# Patient Record
Sex: FEMALE | Race: WHITE | NOT HISPANIC OR LATINO
[De-identification: names, ages, dates, MRNs, and addresses within clinical notes are randomized per-mention and may not be internally consistent; named-entity substitution may affect disease eponyms.]

---

## 2020-01-29 ENCOUNTER — TRANSCRIPTION ENCOUNTER (OUTPATIENT)
Age: 50
End: 2020-01-29

## 2020-04-26 ENCOUNTER — MESSAGE (OUTPATIENT)
Age: 50
End: 2020-04-26

## 2020-05-13 LAB
SARS-COV-2 IGG SERPL IA-ACNC: <0.1 INDEX
SARS-COV-2 IGG SERPL QL IA: NEGATIVE

## 2020-08-03 ENCOUNTER — INPATIENT (INPATIENT)
Facility: HOSPITAL | Age: 50
LOS: 0 days | Discharge: HOME | End: 2020-08-04
Attending: HOSPITALIST | Admitting: HOSPITALIST
Payer: COMMERCIAL

## 2020-08-03 VITALS
RESPIRATION RATE: 18 BRPM | HEART RATE: 91 BPM | DIASTOLIC BLOOD PRESSURE: 70 MMHG | OXYGEN SATURATION: 95 % | SYSTOLIC BLOOD PRESSURE: 128 MMHG

## 2020-08-03 LAB
ALBUMIN SERPL ELPH-MCNC: 4.7 G/DL — SIGNIFICANT CHANGE UP (ref 3.5–5.2)
ALP SERPL-CCNC: 47 U/L — SIGNIFICANT CHANGE UP (ref 30–115)
ALT FLD-CCNC: 50 U/L — HIGH (ref 0–41)
ANION GAP SERPL CALC-SCNC: 13 MMOL/L — SIGNIFICANT CHANGE UP (ref 7–14)
AST SERPL-CCNC: 42 U/L — HIGH (ref 0–41)
BASOPHILS # BLD AUTO: 0.03 K/UL — SIGNIFICANT CHANGE UP (ref 0–0.2)
BASOPHILS NFR BLD AUTO: 0.6 % — SIGNIFICANT CHANGE UP (ref 0–1)
BILIRUB SERPL-MCNC: 0.8 MG/DL — SIGNIFICANT CHANGE UP (ref 0.2–1.2)
BUN SERPL-MCNC: 11 MG/DL — SIGNIFICANT CHANGE UP (ref 10–20)
CALCIUM SERPL-MCNC: 9 MG/DL — SIGNIFICANT CHANGE UP (ref 8.5–10.1)
CHLORIDE SERPL-SCNC: 98 MMOL/L — SIGNIFICANT CHANGE UP (ref 98–110)
CO2 SERPL-SCNC: 27 MMOL/L — SIGNIFICANT CHANGE UP (ref 17–32)
CREAT SERPL-MCNC: 1 MG/DL — SIGNIFICANT CHANGE UP (ref 0.7–1.5)
EOSINOPHIL # BLD AUTO: 0.02 K/UL — SIGNIFICANT CHANGE UP (ref 0–0.7)
EOSINOPHIL NFR BLD AUTO: 0.4 % — SIGNIFICANT CHANGE UP (ref 0–8)
GLUCOSE SERPL-MCNC: 108 MG/DL — HIGH (ref 70–99)
HCG SERPL QL: NEGATIVE — SIGNIFICANT CHANGE UP
HCT VFR BLD CALC: 44.1 % — SIGNIFICANT CHANGE UP (ref 37–47)
HGB BLD-MCNC: 14.9 G/DL — SIGNIFICANT CHANGE UP (ref 12–16)
IMM GRANULOCYTES NFR BLD AUTO: 0.4 % — HIGH (ref 0.1–0.3)
LYMPHOCYTES # BLD AUTO: 1.64 K/UL — SIGNIFICANT CHANGE UP (ref 1.2–3.4)
LYMPHOCYTES # BLD AUTO: 33.8 % — SIGNIFICANT CHANGE UP (ref 20.5–51.1)
MCHC RBC-ENTMCNC: 29 PG — SIGNIFICANT CHANGE UP (ref 27–31)
MCHC RBC-ENTMCNC: 33.8 G/DL — SIGNIFICANT CHANGE UP (ref 32–37)
MCV RBC AUTO: 85.8 FL — SIGNIFICANT CHANGE UP (ref 81–99)
MONOCYTES # BLD AUTO: 0.85 K/UL — HIGH (ref 0.1–0.6)
MONOCYTES NFR BLD AUTO: 17.5 % — HIGH (ref 1.7–9.3)
NEUTROPHILS # BLD AUTO: 2.29 K/UL — SIGNIFICANT CHANGE UP (ref 1.4–6.5)
NEUTROPHILS NFR BLD AUTO: 47.3 % — SIGNIFICANT CHANGE UP (ref 42.2–75.2)
NRBC # BLD: 0 /100 WBCS — SIGNIFICANT CHANGE UP (ref 0–0)
PLATELET # BLD AUTO: 159 K/UL — SIGNIFICANT CHANGE UP (ref 130–400)
POTASSIUM SERPL-MCNC: 4.1 MMOL/L — SIGNIFICANT CHANGE UP (ref 3.5–5)
POTASSIUM SERPL-SCNC: 4.1 MMOL/L — SIGNIFICANT CHANGE UP (ref 3.5–5)
PROT SERPL-MCNC: 7.4 G/DL — SIGNIFICANT CHANGE UP (ref 6–8)
RBC # BLD: 5.14 M/UL — SIGNIFICANT CHANGE UP (ref 4.2–5.4)
RBC # FLD: 13.2 % — SIGNIFICANT CHANGE UP (ref 11.5–14.5)
SARS-COV-2 RNA SPEC QL NAA+PROBE: DETECTED
SODIUM SERPL-SCNC: 138 MMOL/L — SIGNIFICANT CHANGE UP (ref 135–146)
TROPONIN T SERPL-MCNC: <0.01 NG/ML — SIGNIFICANT CHANGE UP
WBC # BLD: 4.85 K/UL — SIGNIFICANT CHANGE UP (ref 4.8–10.8)
WBC # FLD AUTO: 4.85 K/UL — SIGNIFICANT CHANGE UP (ref 4.8–10.8)

## 2020-08-03 PROCEDURE — 93010 ELECTROCARDIOGRAM REPORT: CPT | Mod: 77

## 2020-08-03 PROCEDURE — 71045 X-RAY EXAM CHEST 1 VIEW: CPT | Mod: 26

## 2020-08-03 PROCEDURE — 99285 EMERGENCY DEPT VISIT HI MDM: CPT

## 2020-08-03 PROCEDURE — 93010 ELECTROCARDIOGRAM REPORT: CPT

## 2020-08-03 RX ORDER — IBUPROFEN 200 MG
400 TABLET ORAL ONCE
Refills: 0 | Status: COMPLETED | OUTPATIENT
Start: 2020-08-03 | End: 2020-08-03

## 2020-08-03 RX ORDER — ENOXAPARIN SODIUM 100 MG/ML
40 INJECTION SUBCUTANEOUS DAILY
Refills: 0 | Status: DISCONTINUED | OUTPATIENT
Start: 2020-08-03 | End: 2020-08-04

## 2020-08-03 RX ORDER — SODIUM CHLORIDE 9 MG/ML
1000 INJECTION, SOLUTION INTRAVENOUS ONCE
Refills: 0 | Status: COMPLETED | OUTPATIENT
Start: 2020-08-03 | End: 2020-08-03

## 2020-08-03 RX ADMIN — SODIUM CHLORIDE 1000 MILLILITER(S): 9 INJECTION, SOLUTION INTRAVENOUS at 09:52

## 2020-08-03 RX ADMIN — Medication 400 MILLIGRAM(S): at 11:40

## 2020-08-03 NOTE — ED PROVIDER NOTE - PHYSICAL EXAMINATION
Vital Signs: I have reviewed the initial vital signs.  Constitutional: NAD, well-nourished, appears stated age, no acute distress.  HEENT: Airway patent, moist MM, no erythema/swelling/deformity of oral structures. EOMI, PERRLA.  CV: regular rate, regular rhythm, well-perfused extremities, 2+ b/l DP and radial pulses equal.  Lungs: BCTA, no increased WOB.  ABD: NTND, no guarding or rebound, no pulsatile mass, no hernias.   MSK: Neck supple, nontender, nl ROM, no stepoff. Chest nontender. Back nontender in TLS spine or to b/l bony structures or flanks. Ext nontender, nl rom, no deformity.   INTEG: Skin warm, dry, no rash.  NEURO: A&Ox3, moving all extremities, normal speech. cn 2-12 intact, normal strength, gait, and cerebellar testing  PSYCH: Calm, cooperative, normal affect and interaction.

## 2020-08-03 NOTE — H&P ADULT - NSHPLABSRESULTS_GEN_ALL_CORE
cbc                          14.9   4.85  )-----------( 159      ( 03 Aug 2020 09:15 )             44.1     08-03    138  |  98  |  11  ----------------------------<  108<H>  4.1   |  27  |  1.0    Ca    9.0      03 Aug 2020 09:15    TPro  7.4  /  Alb  4.7  /  TBili  0.8  /  DBili  x   /  AST  42<H>  /  ALT  50<H>  /  AlkPhos  47  08-03    < from: Xray Chest 1 View- PORTABLE-Urgent (08.03.20 @ 10:57) >      Impression:    No radiographic evidence of acute cardiopulmonary disease.      < end of copied text >

## 2020-08-03 NOTE — H&P ADULT - ASSESSMENT
49 y/o female with medical history of HTN, admitted for vasovagal syncope.    # Vasovagal syncope, low suspicion of seizure  - syncopized for 10-15 seconds  - felt warm, nauseous, light headedness, and dizziness prior to blacking out  - Check orthostatics,   - Check echo, to rule out any structural heart disease  - no post-tictal state,  - Admit to Tele  - no need EEG as suspicion for seizure is low    # HTN  - Stable    #) MISC  Activity: IAT  DVT ppx: lovenox   GI ppx: no need  Diet: DASH/TLC  Code: Full  Dispo: anticipate discharge within 24-48 hours  CHG wash

## 2020-08-03 NOTE — H&P ADULT - HISTORY OF PRESENT ILLNESS
49 y/o female with medical history of HTN, presented to ED after an episode of black out. Patient works at [employer]. Earlier during the day, Patient blacked out. Prior to passing out patient suddenly felt warm, light headed, and was very nauseous. Patient passed out for 10-15 seconds, witnessed by co-worker   sustained syncopal episode today at work. Was standing when she developed light-headedness, dark vision and nausea then passed out. Was caught by a co-worker. Did not hit head. No chest pain, palpitations or SOB before or after event. No H/A, no neck pain. No seizure activity. Currently back to baseline.  O/E: Constitutional: Non-toxic in appearance. Eyes: PERRL. No pallor, no jaundice. Neck: Neck supple, no meningeal signs. Respiratory: Lungs CTA and equal b/l. Cardio: S1 S2 regular, no murmur. Equal pulses in all extremities.  ABD: ABD soft, no tenderness. Extremities: No pedal edema, no calf tenderness. Skin: No skin rash. Neuro: A&O x 3, CN II-XII intact, strength 5/5 b/l, sensation intact and equal, finger-to-nose intact. 51 y/o female with medical history of HTN, presented to ED after an episode of black out. Patient works at [employer]. Earlier during the day, Patient blacked out. Prior to passing out patient suddenly felt warm, light headed, and was very nauseous, no palpitation, no perceptual changes. Patient passed out for 10-15 seconds, witnessed by co-worker, did not hit her head. No confusion after she woke up. She was fully coherent. Patient has no cardiac history, no seizure history. This is the first time it has happened. Per patient she did not have any thing for breakfast. Patient denies, headaches, SOB, fever, chills, palpitation, abdominal pain, change in bowel habits, or urinary habit changes.  Patient is admitted to medicine team for vasovagal syncope    ED vit: 128/70 HR: 91

## 2020-08-03 NOTE — ED PROVIDER NOTE - OBJECTIVE STATEMENT
50yF presents s/p syncopal episode at work. pt is [employer] employee who was standing doing nonexertional work when she felt a wave of nausea, became lightheaded, and slumped over. did not fall to the ground or hit head. brief loc, without residual symptoms. no cp sob palpitations ha fnd. asymptomatic at present. only pmh is htn.

## 2020-08-03 NOTE — H&P ADULT - ATTENDING COMMENTS
HPI as above.  Interval history: Pt seen and examined at bedside. No cp or sob.  No further syncopal events.   Vital Signs (24 Hrs):  T(C): 35.2 (08-04-20 @ 05:00), Max: 37.1 (08-03-20 @ 15:47)  HR: 67 (08-04-20 @ 07:30) (67 - 81)  BP: 143/89 (08-04-20 @ 05:00) (123/72 - 170/85)  RR: 18 (08-04-20 @ 07:30) (18 - 20)  SpO2: 99% (08-04-20 @ 07:30) (98% - 99%)  Wt(kg): --  Daily     Daily     I&O's Summary    PHYSICAL EXAM:  GENERAL: NAD, well-developed  HEAD:  Atraumatic, Normocephalic  EYES: EOMI, PERRLA, conjunctiva and sclera clear  NECK: Supple, No JVD  CHEST/LUNG: Clear to auscultation bilaterally; No wheeze  HEART: Regular rate and rhythm; No murmurs, rubs, or gallops  ABDOMEN: Soft, Nontender, Nondistended; Bowel sounds present  EXTREMITIES:  2+ Peripheral Pulses, No clubbing, cyanosis, or edema  PSYCH: AAOx3  NEUROLOGY: non-focal  SKIN: No rashes or lesions    Labs reviewed  Imaging reviewed: < from: Xray Chest 1 View- PORTABLE-Urgent (08.03.20 @ 10:57) >    Impression:    No radiographic evidence of acute cardiopulmonary disease.    < end of copied text >    CTH pending.       EKG reviewed: < from: 12 Lead ECG (08.03.20 @ 12:07) >    Diagnosis Line Normal sinus rhythm  Nonspecific ST abnormality  Abnormal ECG    < end of copied text >    Plan  #syncope- likely 2/2 vasovagal  pt states that she passed out 10-15 seconds, she was t work and was standing when it happened  pt found to be covid 19 positive  orthostats neg  no events on cardiac telemonitoring overnight  pt states that she was not drinking enough water and is also on HCTZ  likely DC HCTZ on DC  CTH ordered r/o structual, unlikely CTA  Unlikely SZR  Limited echo ordered, if unable to do inpt possible outpt echo given her covid status   will check d-dimer given covid status     #COVID 19- pt asymptomatic, no infiltrates on CXR and pt on RA. no antibiotics. Counselled on quarantining     #HTN- can continue valsartan outpt but hold hCTZ     #Progress Note Handoff  Pending (specify):  if above tests are neg can be dced and follow up outpt  Family discussion: linda pt at bedside and agreed to plan   Disposition: Home__x_/SNF___/Other________/Unknown at this time________ HPI as above.  Interval history: Pt seen and examined at bedside. No cp or sob.  No further syncopal events.   Vital Signs (24 Hrs):  T(C): 35.2 (08-04-20 @ 05:00), Max: 37.1 (08-03-20 @ 15:47)  HR: 67 (08-04-20 @ 07:30) (67 - 81)  BP: 143/89 (08-04-20 @ 05:00) (123/72 - 170/85)  RR: 18 (08-04-20 @ 07:30) (18 - 20)  SpO2: 99% (08-04-20 @ 07:30) (98% - 99%)  Wt(kg): --  Daily     Daily     I&O's Summary    PHYSICAL EXAM:  GENERAL: NAD, well-developed  HEAD:  Atraumatic, Normocephalic  EYES: EOMI, PERRLA, conjunctiva and sclera clear  NECK: Supple, No JVD  CHEST/LUNG: Clear to auscultation bilaterally; No wheeze  HEART: Regular rate and rhythm; No murmurs, rubs, or gallops  ABDOMEN: Soft, Nontender, Nondistended; Bowel sounds present  EXTREMITIES:  2+ Peripheral Pulses, No clubbing, cyanosis, or edema  PSYCH: AAOx3  NEUROLOGY: non-focal  SKIN: No rashes or lesions    Labs reviewed  Imaging reviewed: < from: Xray Chest 1 View- PORTABLE-Urgent (08.03.20 @ 10:57) >    Impression:    No radiographic evidence of acute cardiopulmonary disease.    < end of copied text >    CTH pending.       EKG reviewed: < from: 12 Lead ECG (08.03.20 @ 12:07) >    Diagnosis Line Normal sinus rhythm  Nonspecific ST abnormality  Abnormal ECG    < end of copied text >    Plan  #syncope- likely 2/2 vasovagal  pt states that she passed out 10-15 seconds, she was t work and was standing when it happened  pt found to be covid 19 positive  orthostats neg  no events on cardiac telemonitoring overnight  pt states that she was not drinking enough water and is also on HCTZ  likely DC HCTZ on DC  CTH ordered r/o structual, unlikely CTA  Unlikely SZR  Limited echo ordered, if unable to do inpt possible outpt echo given her covid status   will check d-dimer given covid status   trop neg x1 will trend    #COVID 19- pt asymptomatic, no infiltrates on CXR and pt on RA. no antibiotics. Counselled on quarantining     #HTN- can continue valsartan outpt but hold hCTZ     #Progress Note Handoff  Pending (specify):  if above tests are neg can be dced and follow up outpt  Family discussion: linda pt at bedside and agreed to plan   Disposition: Home__x_/SNF___/Other________/Unknown at this time________

## 2020-08-03 NOTE — ED ADULT NURSE NOTE - NS PRO PASSIVE SMOKE EXP
What Type Of Note Output Would You Prefer (Optional)?: Standard Output How Severe Is Your Skin Lesion?: mild Has Your Skin Lesion Been Treated?: been treated Is This A New Presentation, Or A Follow-Up?: Skin Lesion Unknown

## 2020-08-03 NOTE — H&P ADULT - NSHPPHYSICALEXAM_GEN_ALL_CORE
GENERAL: NAD, well-developed, AAOx3  HEENT:  Atraumatic, Normocephalic. EOMI, PERRLA, conjunctiva and sclera clear, No JVD  PULMONARY: Clear to auscultation bilaterally; No wheeze  CARDIOVASCULAR: Regular rate and rhythm; No murmurs, rubs, or gallops  GASTROINTESTINAL: Soft, Nontender, Nondistended; Bowel sounds present  MUSCULOSKELETAL:  2+ Peripheral Pulses, No clubbing, cyanosis, or edema  NEUROLOGY: non-focal  SKIN: No rashes or lesions

## 2020-08-03 NOTE — ED PROVIDER NOTE - NS ED ROS FT
Eyes:  No visual changes, eye pain or discharge.  ENMT:  No hearing changes, pain, no sore throat or runny nose, no difficulty swallowing  Cardiac:  No chest pain, SOB or edema. No chest pain with exertion.  Respiratory:  No cough or respiratory distress.    GI:  +nausea  :  No dysuria, frequency or burning.  MS:  +weakness  Neuro:  +LOC  Skin:  No skin rash.   Endocrine: No history of thyroid disease or diabetes.

## 2020-08-03 NOTE — ED PROVIDER NOTE - PROGRESS NOTE DETAILS
ATTENDING NOTE: 51 y/o female with hx of HTN, sustained syncopal episode today at work. Was standing when she developed light-headedness, sweating and nausea then passed out. Was caught by a co-worker. Did not hit head. No chest pain, palpitations or SOB before or after event. No H/A, no neck pain. No seizure activity. Currently back to baseline.  O/E: Constitutional: Non-toxic in appearance. Eyes: PERRL. No pallor, no jaundice. Neck: Neck supple, no meningeal signs. Respiratory: Lungs CTA and equal b/l. Cardio: S1 S2 regular, no murmur. Equal pulses in all extremities.  ABD: ABD soft, no tenderness. Extremities: No pedal edema, no calf tenderness. Skin: No skin rash. Neuro: No neurologic deficits.   EKG: sinus @ 80, 1mm ST depressions V4-V6, II, III, aVF. No WPW, no Brugada, QTc 457.  Imp: Syncope. History suggest vasovagal however with EKG changes. No previous EKG available.   A/P: Cardiac monitor, will check labs, electrolytes, cardiac enzymes. Will re-assess. ATTENDING NOTE: 49 y/o female with hx of HTN, sustained syncopal episode today at work. Was standing when she developed light-headedness, dark vision and nausea then passed out. Was caught by a co-worker. Did not hit head. No chest pain, palpitations or SOB before or after event. No H/A, no neck pain. No seizure activity. Currently back to baseline.  O/E: Constitutional: Non-toxic in appearance. Eyes: PERRL. No pallor, no jaundice. Neck: Neck supple, no meningeal signs. Respiratory: Lungs CTA and equal b/l. Cardio: S1 S2 regular, no murmur. Equal pulses in all extremities.  ABD: ABD soft, no tenderness. Extremities: No pedal edema, no calf tenderness. Skin: No skin rash. Neuro: A&O x 3, CN II-XII intact, strength 5/5 b/l, sensation intact and equal, finger-to-nose intact.  EKG: sinus @ 80, 1mm ST depressions V4-V6, II, III, aVF. No WPW, no Brugada, QTc 457.  Imp: Syncope. History suggest vasovagal however with EKG changes. No previous EKG available.   A/P: Cardiac monitor, will check labs, electrolytes, cardiac enzymes. Will re-assess.

## 2020-08-03 NOTE — ED PROVIDER NOTE - CLINICAL SUMMARY MEDICAL DECISION MAKING FREE TEXT BOX
Patient hd second episode of near syncope while sitting in the stretcher in te ED. Heart rate dropped to 40. Quickly returned ot baseline. Tn negative. Given recurrent syncope and ST depressions, will admit to monitored setting.

## 2020-08-04 ENCOUNTER — TRANSCRIPTION ENCOUNTER (OUTPATIENT)
Age: 50
End: 2020-08-04

## 2020-08-04 VITALS
RESPIRATION RATE: 18 BRPM | TEMPERATURE: 96 F | DIASTOLIC BLOOD PRESSURE: 76 MMHG | SYSTOLIC BLOOD PRESSURE: 136 MMHG | HEART RATE: 71 BPM

## 2020-08-04 PROBLEM — Z00.00 ENCOUNTER FOR PREVENTIVE HEALTH EXAMINATION: Status: ACTIVE | Noted: 2020-08-04

## 2020-08-04 LAB
A1C WITH ESTIMATED AVERAGE GLUCOSE RESULT: 5.8 % — HIGH (ref 4–5.6)
ALBUMIN SERPL ELPH-MCNC: 4.5 G/DL — SIGNIFICANT CHANGE UP (ref 3.5–5.2)
ALP SERPL-CCNC: 42 U/L — SIGNIFICANT CHANGE UP (ref 30–115)
ALT FLD-CCNC: 46 U/L — HIGH (ref 0–41)
ANION GAP SERPL CALC-SCNC: 12 MMOL/L — SIGNIFICANT CHANGE UP (ref 7–14)
AST SERPL-CCNC: 35 U/L — SIGNIFICANT CHANGE UP (ref 0–41)
BASOPHILS # BLD AUTO: 0.02 K/UL — SIGNIFICANT CHANGE UP (ref 0–0.2)
BASOPHILS NFR BLD AUTO: 0.4 % — SIGNIFICANT CHANGE UP (ref 0–1)
BILIRUB SERPL-MCNC: 0.7 MG/DL — SIGNIFICANT CHANGE UP (ref 0.2–1.2)
BUN SERPL-MCNC: 11 MG/DL — SIGNIFICANT CHANGE UP (ref 10–20)
CALCIUM SERPL-MCNC: 9.3 MG/DL — SIGNIFICANT CHANGE UP (ref 8.5–10.1)
CHLORIDE SERPL-SCNC: 98 MMOL/L — SIGNIFICANT CHANGE UP (ref 98–110)
CHOLEST SERPL-MCNC: 183 MG/DL — SIGNIFICANT CHANGE UP (ref 100–200)
CO2 SERPL-SCNC: 29 MMOL/L — SIGNIFICANT CHANGE UP (ref 17–32)
CREAT SERPL-MCNC: 0.8 MG/DL — SIGNIFICANT CHANGE UP (ref 0.7–1.5)
D DIMER BLD IA.RAPID-MCNC: 76 NG/ML DDU — SIGNIFICANT CHANGE UP (ref 0–230)
EOSINOPHIL # BLD AUTO: 0.03 K/UL — SIGNIFICANT CHANGE UP (ref 0–0.7)
EOSINOPHIL NFR BLD AUTO: 0.6 % — SIGNIFICANT CHANGE UP (ref 0–8)
ESTIMATED AVERAGE GLUCOSE: 120 MG/DL — HIGH (ref 68–114)
GLUCOSE SERPL-MCNC: 111 MG/DL — HIGH (ref 70–99)
HCT VFR BLD CALC: 43.6 % — SIGNIFICANT CHANGE UP (ref 37–47)
HDLC SERPL-MCNC: 34 MG/DL — LOW
HGB BLD-MCNC: 14.5 G/DL — SIGNIFICANT CHANGE UP (ref 12–16)
IMM GRANULOCYTES NFR BLD AUTO: 0.2 % — SIGNIFICANT CHANGE UP (ref 0.1–0.3)
LIPID PNL WITH DIRECT LDL SERPL: 116 MG/DL — SIGNIFICANT CHANGE UP (ref 4–129)
LYMPHOCYTES # BLD AUTO: 1.82 K/UL — SIGNIFICANT CHANGE UP (ref 1.2–3.4)
LYMPHOCYTES # BLD AUTO: 37.5 % — SIGNIFICANT CHANGE UP (ref 20.5–51.1)
MAGNESIUM SERPL-MCNC: 2 MG/DL — SIGNIFICANT CHANGE UP (ref 1.8–2.4)
MCHC RBC-ENTMCNC: 28.5 PG — SIGNIFICANT CHANGE UP (ref 27–31)
MCHC RBC-ENTMCNC: 33.3 G/DL — SIGNIFICANT CHANGE UP (ref 32–37)
MCV RBC AUTO: 85.8 FL — SIGNIFICANT CHANGE UP (ref 81–99)
MONOCYTES # BLD AUTO: 0.59 K/UL — SIGNIFICANT CHANGE UP (ref 0.1–0.6)
MONOCYTES NFR BLD AUTO: 12.2 % — HIGH (ref 1.7–9.3)
NEUTROPHILS # BLD AUTO: 2.38 K/UL — SIGNIFICANT CHANGE UP (ref 1.4–6.5)
NEUTROPHILS NFR BLD AUTO: 49.1 % — SIGNIFICANT CHANGE UP (ref 42.2–75.2)
NRBC # BLD: 0 /100 WBCS — SIGNIFICANT CHANGE UP (ref 0–0)
PHOSPHATE SERPL-MCNC: 3 MG/DL — SIGNIFICANT CHANGE UP (ref 2.1–4.9)
PLATELET # BLD AUTO: 152 K/UL — SIGNIFICANT CHANGE UP (ref 130–400)
POTASSIUM SERPL-MCNC: 4 MMOL/L — SIGNIFICANT CHANGE UP (ref 3.5–5)
POTASSIUM SERPL-SCNC: 4 MMOL/L — SIGNIFICANT CHANGE UP (ref 3.5–5)
PROT SERPL-MCNC: 7.1 G/DL — SIGNIFICANT CHANGE UP (ref 6–8)
RBC # BLD: 5.08 M/UL — SIGNIFICANT CHANGE UP (ref 4.2–5.4)
RBC # FLD: 13.1 % — SIGNIFICANT CHANGE UP (ref 11.5–14.5)
SODIUM SERPL-SCNC: 139 MMOL/L — SIGNIFICANT CHANGE UP (ref 135–146)
TOTAL CHOLESTEROL/HDL RATIO MEASUREMENT: 5.4 RATIO — SIGNIFICANT CHANGE UP (ref 4–5.5)
TRIGL SERPL-MCNC: 162 MG/DL — HIGH (ref 10–149)
TROPONIN T SERPL-MCNC: <0.01 NG/ML — SIGNIFICANT CHANGE UP
WBC # BLD: 4.85 K/UL — SIGNIFICANT CHANGE UP (ref 4.8–10.8)
WBC # FLD AUTO: 4.85 K/UL — SIGNIFICANT CHANGE UP (ref 4.8–10.8)

## 2020-08-04 PROCEDURE — 70450 CT HEAD/BRAIN W/O DYE: CPT | Mod: 26

## 2020-08-04 PROCEDURE — 99236 HOSP IP/OBS SAME DATE HI 85: CPT

## 2020-08-04 RX ORDER — IBUPROFEN 200 MG
400 TABLET ORAL EVERY 8 HOURS
Refills: 0 | Status: DISCONTINUED | OUTPATIENT
Start: 2020-08-04 | End: 2020-08-04

## 2020-08-04 RX ADMIN — Medication 400 MILLIGRAM(S): at 12:40

## 2020-08-04 RX ADMIN — Medication 400 MILLIGRAM(S): at 03:12

## 2020-08-04 RX ADMIN — Medication 400 MILLIGRAM(S): at 11:54

## 2020-08-04 RX ADMIN — Medication 400 MILLIGRAM(S): at 04:15

## 2020-08-04 NOTE — DISCHARGE NOTE NURSING/CASE MANAGEMENT/SOCIAL WORK - PATIENT PORTAL LINK FT
You can access the FollowMyHealth Patient Portal offered by [facility] by registering at the following website: [email or web address]. By joining [facility]’s FollowMyHealth portal, you will also be able to view your health information using other applications (apps) compatible with our system.

## 2020-08-04 NOTE — PROGRESS NOTE ADULT - ASSESSMENT
50 year old F with medical history of HTN, admitted due to an episode of syncope.    #Likely Vasovagal syncope  - synopsized for 10-15 seconds. Was preceded by feeling of warmth, nausea, light handedness and dizziness  - Orthostatics negative   - No need for EEG. Seizure highly unlikely   - Echo pending     #Asymptomatic COVID-19 infection   - Pulse Ox fine on room air  - No indication for treatement     #HTN  - Stable    #Misc  - DVT Prophylaxis: Lovenox   - Diet: DASH/TLC  - GI Prophylaxis: Not indicated   - Activity: As tolerated   - Code Status: Full Code     Dispo: Possible discharge today. Pending echo

## 2020-08-04 NOTE — DISCHARGE NOTE PROVIDER - HOSPITAL COURSE
50 year old F with medical history of HTN, admitted due to an episode of syncope.        #Likely Vasovagal syncope    Synopsized for 10-15 seconds. Preceded by feeling of warmth, nausea, light handedness and dizziness. Orthostatics negative. No need for EEG. Seizure highly unlikely. Reports missing her morning meal on the day of presentation          #Asymptomatic COVID-19 infection     Pulse Ox fine on room air. No indication for treatement. 14 day quarantine post-discharge         #HTN    Stable. Continue home meds 50 year old F with medical history of HTN, admitted due to an episode of syncope.        #Likely Vasovagal syncope    Synopsized for 10-15 seconds. Preceded by feeling of warmth, nausea, light handedness and dizziness. Orthostatics negative. No need for EEG. Seizure highly unlikely. Reports missing her morning meal on the day of presentation. CT head negative         #Asymptomatic COVID-19 infection     Pulse Ox fine on room air. No indication for treatement. 14 day quarantine post-discharge         #HTN    Stable. Continue home meds

## 2020-08-04 NOTE — PROGRESS NOTE ADULT - SUBJECTIVE AND OBJECTIVE BOX
EDY BYRNE 50y Female  MRN#: [id number]   Hospital Day: 1d    HPI  49 y/o female with medical history of HTN, presented to ED after an episode of black out. Patient works at [employer]. Earlier during the day, Patient blacked out. Prior to passing out patient suddenly felt warm, light headed, and was very nauseous, no palpitation, no perceptual changes. Patient passed out for 10-15 seconds, witnessed by co-worker, did not hit her head. No confusion after she woke up. She was fully coherent. Patient has no cardiac history, no seizure history. This is the first time it has happened. Per patient she did not have any thing for breakfast. Patient denies, headaches, SOB, fever, chills, palpitation, abdominal pain, change in bowel habits, or urinary habit changes.  Patient is admitted to medicine team for vasovagal syncope    ED vit: 128/70 HR: 91 (03 Aug 2020 14:36)      SUBJECTIVE  Patient is a 50y old Female who presents with a chief complaint of Syncope    INTERVAL HPI AND OVERNIGHT EVENTS:  Patient was examined and seen at bedside. This morning she is resting comfortably in bed and reports no issues or overnight events. She reports no further episodes of syncope      OBJECTIVE  PAST MEDICAL & SURGICAL HISTORY  HTN (hypertension)  No significant past surgical history    ALLERGIES:  No Known Allergies    MEDICATIONS:  STANDING MEDICATIONS  enoxaparin Injectable 40 milliGRAM(s) SubCutaneous daily    PRN MEDICATIONS  ibuprofen  Tablet. 400 milliGRAM(s) Oral every 8 hours PRN      VITAL SIGNS: Last 24 Hours  T(C): 35.2 (04 Aug 2020 05:00), Max: 37.1 (03 Aug 2020 15:47)  T(F): 95.4 (04 Aug 2020 05:00), Max: 98.8 (03 Aug 2020 15:47)  HR: 67 (04 Aug 2020 05:00) (67 - 91)  BP: 143/89 (04 Aug 2020 05:00) (123/72 - 170/85)  BP(mean): --  RR: 18 (04 Aug 2020 05:00) (18 - 20)  SpO2: 99% (03 Aug 2020 21:00) (95% - 99%)    LABS:                        14.5   4.85  )-----------( 152      ( 04 Aug 2020 05:27 )             43.6     08-04    139  |  98  |  11  ----------------------------<  111<H>  4.0   |  29  |  0.8    Ca    9.3      04 Aug 2020 05:27  Phos  3.0     08-04  Mg     2.0     08-04    TPro  7.1  /  Alb  4.5  /  TBili  0.7  /  DBili  x   /  AST  35  /  ALT  46<H>  /  AlkPhos  42  08-04    Troponin T, Serum: <0.01 ng/mL (08-03-20 @ 09:15)    CARDIAC MARKERS ( 03 Aug 2020 09:15 )  x     / <0.01 ng/mL / x     / x     / x        PHYSICAL EXAM:  CONSTITUTIONAL: No acute distress, well-developed, well-groomed, AAOx3  HEAD: Atraumatic, normocephalic  EYES: EOM intact, PERRLA, conjunctiva and sclera clear  ENT: Supple, no masses, no thyromegaly, no bruits, no JVD; moist mucous membranes  PULMONARY: Clear to auscultation bilaterally; no wheezes, rales, or rhonchi  CARDIOVASCULAR: Regular rate and rhythm; no murmurs, rubs, or gallops  GASTROINTESTINAL: Soft, non-tender, non-distended; bowel sounds present  MUSCULOSKELETAL: 2+ peripheral pulses; no clubbing, no cyanosis, no edema  NEUROLOGY: non-focal  SKIN: No rashes or lesions; warm and dry

## 2020-08-04 NOTE — DISCHARGE NOTE PROVIDER - NSDCCPCAREPLAN_GEN_ALL_CORE_FT
PRINCIPAL DISCHARGE DIAGNOSIS  Diagnosis: Syncope  Assessment and Plan of Treatment: You came in with syncope which was likely vasovagal in origin.   Syncope happens when the brain temporarily doesn't get enough blood. One of the most common types of syncope is called vasovagal syncope, which is the most common cause of reflex syncope. A variety of conditions can trigger vasovagal syncope, including physical or psychological stress, dehydration, bleeding, or pain. The heart rate may slow dramatically at the time of the faint, and the blood vessels (mainly the veins) in the body expand, causing blood to blood pool in the lower extremities and the bowels, resulting in less return to the heart and a low blood pressure (hypotension). This causes a decrease in blood flow to the brain.  In some cases, vasovagal syncope is triggered by an emotional response to a stimulus, such as fear of injury, heat exposure, the sight of blood, or extreme pain. In other cases, it is caused by abnormal nervous system responses to activities such as urinating, having a bowel movement, coughing, or swallowing. In still other cases, no trigger can be identified.  In most cases of vasovagal syncope, you have some warning that you are near fainting. These signs include dizziness, feeling hot or cold, nausea, pale skin, "tunnel-like" vision, disturbance of hearing, and profuse sweating. After the episode, symptoms may continue because of continued low blood pressure. Some people feel extremely tired.  Please follow up with your PCP 1 - 2 weeks after discharge. PRINCIPAL DISCHARGE DIAGNOSIS  Diagnosis: Syncope  Assessment and Plan of Treatment: You came in with syncope which was likely vasovagal in origin.   Syncope happens when the brain temporarily doesn't get enough blood. One of the most common types of syncope is called vasovagal syncope, which is the most common cause of reflex syncope. A variety of conditions can trigger vasovagal syncope, including physical or psychological stress, dehydration, bleeding, or pain. The heart rate may slow dramatically at the time of the faint, and the blood vessels (mainly the veins) in the body expand, causing blood to blood pool in the lower extremities and the bowels, resulting in less return to the heart and a low blood pressure (hypotension). This causes a decrease in blood flow to the brain.  In some cases, vasovagal syncope is triggered by an emotional response to a stimulus, such as fear of injury, heat exposure, the sight of blood, or extreme pain. In other cases, it is caused by abnormal nervous system responses to activities such as urinating, having a bowel movement, coughing, or swallowing. In still other cases, no trigger can be identified.  In most cases of vasovagal syncope, you have some warning that you are near fainting. These signs include dizziness, feeling hot or cold, nausea, pale skin, "tunnel-like" vision, disturbance of hearing, and profuse sweating. After the episode, symptoms may continue because of continued low blood pressure. Some people feel extremely tired.  If you experience symptoms again please call 911 or go to the nearest emergency department   Please follow up with your PCP 1 - 2 weeks after discharge.        SECONDARY DISCHARGE DIAGNOSES  Diagnosis: COVID-19  Assessment and Plan of Treatment: You were tested positive for COVID-19 infection during you hospital stay. Please isolate yourself for 14 days after being discharged from the hospital. Your pulse ox was fine during your hospital stay.   If you experience symptoms again please call 911 or go to the nearest emergency department

## 2020-08-05 ENCOUNTER — TRANSCRIPTION ENCOUNTER (OUTPATIENT)
Age: 50
End: 2020-08-05

## 2020-08-06 ENCOUNTER — TRANSCRIPTION ENCOUNTER (OUTPATIENT)
Age: 50
End: 2020-08-06

## 2020-08-06 DIAGNOSIS — R55 SYNCOPE AND COLLAPSE: ICD-10-CM

## 2020-08-06 DIAGNOSIS — I10 ESSENTIAL (PRIMARY) HYPERTENSION: ICD-10-CM

## 2020-08-06 DIAGNOSIS — U07.1 COVID-19: ICD-10-CM

## 2020-08-09 ENCOUNTER — TRANSCRIPTION ENCOUNTER (OUTPATIENT)
Age: 50
End: 2020-08-09

## 2020-10-21 NOTE — ED ADULT TRIAGE NOTE - NS ED NURSE BANDS TYPE
Yamel JUDE Pablo  10/21/2020    HPI:  Patient is a 68 y.o. female h/o scleroderma (recent rx with iv veletri / prostaglandin) who is here today for f/u -  She presents with bilateral lower extremity venous ulcers, these began in February 2015; healed in summer 2015.   She also reports abut a 10 year history of venous ulcers, which in the past have always responded to compression dressings. She did have a 1 year period of time immediately preceding this most recent episode where she did not have ulcers.  Now has toe ulcers thought to be from sclerodema  - s/p recent iv veletri    S/p   10/16/20   R GSV evlt 10 + 29cm - doing well     No MI/stroke  Tobacco use: no  History of DVT/PE: no    Has done local care to area for years; not tolerated compression  Pain interfers with daily activities; has attempted elevation and analgesics with minimal relief and pain persists.    Past Medical History:   Diagnosis Date    Abnormal Pap smear     Acid reflux     Allergy     Arthritis     GERD (gastroesophageal reflux disease)     History of migraine headaches     Hypertension     Idiopathic neuropathy 7/20/2012    ILD (interstitial lung disease) 11/6/2013    Iron deficiency anemia 3/18/2014    MRSA carrier     Osteopenia     Pneumonia     Pulmonary fibrosis     Pulmonary hypertension     Raynaud's disease     Scleroderma, diffuse     Sjogren's syndrome     Vitamin D deficiency 11/14/2013     Past Surgical History:   Procedure Laterality Date    24 HOUR IMPEDANCE PH MONITORING OF ESOPHAGUS IN PATIENT NOT TAKING ACID REDUCING MEDICATIONS N/A 3/4/2020    Procedure: IMPEDANCE PH STUDY, ESOPHAGEAL, 24 HOUR, IN PATIENT NOT TAKING ACID REDUCING MEDICATION;  Surgeon: Annamaria Mendoza MD;  Location: [facility] ([address] [place] [address]);  Service: Endoscopy;  Laterality: N/A;  OFF PPI/H2 Blocker   Motility Studies   Hold Narcotics x 1 days   Hold TCA x 1 days  2/26 - LVM attempting to confirm appt  2/27 - Confirmed appt    BREAST  BIOPSY      Left, benign    CERVICAL CONIZATION   W/ LASER  1970    COLONOSCOPY      COLONOSCOPY N/A 3/29/2019    Procedure: COLONOSCOPY;  Surgeon: Annamaria Mendoza MD;  Location: [facility] (2ND FLR);  Service: Endoscopy;  Laterality: N/A;    DILATION AND CURETTAGE OF UTERUS      ESOPHAGEAL MANOMETRY WITH MEASUREMENT OF IMPEDANCE N/A 3/4/2020    Procedure: MANOMETRY, ESOPHAGUS, WITH IMPEDANCE MEASUREMENT;  Surgeon: Annamaria Mendoza MD;  Location: [facility] ENDO (4TH FLR);  Service: Endoscopy;  Laterality: N/A;  OFF PPI/H2 Blocker   Motility Studies   Hold Narcotics x 1 days   Hold TCA x 1 days    ESOPHAGOGASTRODUODENOSCOPY      ESOPHAGOGASTRODUODENOSCOPY N/A 3/29/2019    Procedure: EGD (ESOPHAGOGASTRODUODENOSCOPY);  Surgeon: Annamaria Mendoza MD;  Location: [facility] ENDO (2ND FLR);  Service: Endoscopy;  Laterality: N/A;  pulmonary htn    HYSTERECTOMY  1990    FRANDY (AUB, Fibroids), ovaries remain     Family History   Problem Relation Age of Onset    Breast cancer Mother     No Known Problems Daughter     No Known Problems Son     No Known Problems Daughter     No Known Problems Son     Breast cancer Sister     Breast cancer Maternal Aunt     Osteoarthritis Brother     Melanoma Neg Hx     Colon cancer Neg Hx     Crohn's disease Neg Hx     Stomach cancer Neg Hx     Ulcerative colitis Neg Hx     Rectal cancer Neg Hx     Irritable bowel syndrome Neg Hx     Esophageal cancer Neg Hx     Celiac disease Neg Hx     Ovarian cancer Neg Hx      Social History     Socioeconomic History    Marital status:      Spouse name: Lewis    Number of children: 4    Years of education: Not on file    Highest education level: Not on file   Occupational History    Occupation: -retired     Employer: [employer]     Comment: [employer]     Employer: RETIRED   Social Needs    Financial resource strain: Not very hard    Food insecurity     Worry: Never true     Inability: Never true     Transportation needs     Medical: No     Non-medical: No   Tobacco Use    Smoking status: Never Smoker    Smokeless tobacco: Never Used   Substance and Sexual Activity    Alcohol use: Yes     Alcohol/week: 0.0 standard drinks     Frequency: Monthly or less     Drinks per session: 1 or 2     Binge frequency: Never     Comment: ocasionally    Drug use: No    Sexual activity: Yes     Partners: Male     Birth control/protection: None   Lifestyle    Physical activity     Days per week: 0 days     Minutes per session: 0 min    Stress: Only a little   Relationships    Social connections     Talks on phone: More than three times a week     Gets together: Patient refused     Attends Mosque service: More than 4 times per year     Active member of club or organization: No     Attends meetings of clubs or organizations: Never     Relationship status:    Other Topics Concern    Are you pregnant or think you may be? No    Breast-feeding No   Social History Narrative           Current Outpatient Medications:     albuterol (VENTOLIN HFA) 90 mcg/actuation inhaler, Inhale 2 puffs into the lungs every 4 (four) hours as needed for Wheezing. Rescue, Disp: 18 g, Rfl: 1    carboxymethylcellulose sodium (REFRESH TEARS) 0.5 % Drop, Apply 1-2 drops to every as needed, Disp: , Rfl:     cephALEXin (KEFLEX) 500 MG capsule, TK ONE C PO  Q 8 H TAT, Disp: , Rfl:     ergocalciferol (ERGOCALCIFEROL) 50,000 unit Cap, Take 1 capsule (50,000 Units total) by mouth every 7 days., Disp: 12 capsule, Rfl: 1    meloxicam (MOBIC) 7.5 MG tablet, Take 1 tablet (7.5 mg total) by mouth once daily., Disp: 7 tablet, Rfl: 0    meloxicam (MOBIC) 7.5 MG tablet, Take 1 tablet (7.5 mg total) by mouth once daily., Disp: 7 tablet, Rfl: 0    multivitamin capsule, Take 1 capsule by mouth once daily. , Disp: , Rfl:     mycophenolate mofetil (CELLCEPT) 200 mg/mL SusR, Take 3.75 mLs (750 mg total) by mouth 2 (two) times daily., Disp: 480 mL,  Name band; Rfl: 1    NIFEdipine (ADALAT CC) 90 MG TbSR, TAKE 1 TABLET(90 MG) BY MOUTH EVERY DAY, Disp: 90 tablet, Rfl: 3    omeprazole (PRILOSEC) 40 MG capsule, , Disp: , Rfl:     pravastatin (PRAVACHOL) 20 MG tablet, TAKE 1 TABLET BY MOUTH EVERY EVENING, Disp: 90 tablet, Rfl: 0    pregabalin (LYRICA) 100 MG capsule, Take 1 capsule (100 mg total) by mouth 2 (two) times daily., Disp: 60 capsule, Rfl: 5    PREVIDENT 5000 SENSITIVE 1.1-5 % Pste, BRUSH FOR 2 MINUTES TWICE PER DAY, Disp: , Rfl:     spironolactone (ALDACTONE) 25 MG tablet, TAKE 1 TABLET(25 MG) BY MOUTH EVERY DAY, Disp: 30 tablet, Rfl: 11    tadalafil (ADCIRCA) 20 mg Tab, Take 2 tablets (40 mg total) by mouth once daily., Disp: 28 tablet, Rfl: 11    tiZANidine (ZANAFLEX) 4 MG tablet, Take 1 tablet (4 mg total) by mouth nightly as needed., Disp: 30 tablet, Rfl: 1    traZODone (DESYREL) 50 MG tablet, Take 1 tablet (50 mg total) by mouth every evening., Disp: 30 tablet, Rfl: 4    ALPRAZolam (XANAX) 0.5 MG tablet, Take 1 tablet (0.5 mg total) by mouth once as needed for Anxiety. Take one 0.5 mg tablet of xanax 1h before the EVLT procedure.  You may take a second tablet right before the procedure; please check with our nurse., Disp: 2 tablet, Rfl: 0    conjugated estrogens (PREMARIN) vaginal cream, Place 1 g vaginally twice a week. Place pea sized amount to vagina twice per day for two weeks then twice per week therafter, Disp: 30 g, Rfl: 12    Current Facility-Administered Medications:     lidocaine-EPINEPHrine 1%-1:100,000 30 mL, lidocaine HCL 10 mg/ml (1%) 20 mL, sodium bicarbonate 10 mL in sodium chloride 0.9% 500 mL solution, , MISCELLANEOUS, 1 time in Clinic/HOD, Timmy Claudio MD    REVIEW OF SYSTEMS:  General: negative; ENT: negative; Allergy and Immunology: negative; Hematological and Lymphatic: Negative; Endocrine: negative; Respiratory: no cough, shortness of breath, or wheezing; Cardiovascular: no chest pain or dyspnea on exertion;  Gastrointestinal: no abdominal pain/back, change in bowel habits, or bloody stools; Genito-Urinary: no dysuria, trouble voiding, or hematuria; Musculoskeletal: Leg discomfort secondary to chronic venous insufficiency; Neurological: no TIA or stroke symptoms; Psychiatric: no nervousness, anxiety or depression.    PHYSICAL EXAM:   Right Arm BP - Sittin/65 (10/21/20 1347)  Left Arm BP - Sittin/59 (10/21/20 1347)   Vitals:    10/21/20 1346   BP: (!) 141/65   Pulse: 68   Temp: 97.6 °F (36.4 °C)     Pulse: 68  Temp: 97.6 °F (36.4 °C)    General appearance:  Alert, well-appearing, and in no distress.  Oriented to person, place, and time   Neurological: Normal speech, no focal findings noted; CN II - XII grossly intact           Musculoskeletal: Digits/nail without cyanosis/clubbing.  Normal muscle strength/tone.                 Neck: Supple, no significant adenopathy; thyroid is not enlarged                  Carotid bruits cannot be auscultated                Chest:  Clear to auscultation, no wheezes, rales or rhonchi, symmetric air entry     No use of accessory muscles             Cardiac: Normal rate and regular rhythm, S1 and S2 normal; PMI non-displaced          Abdomen: Soft, nontender, nondistended, no masses or organomegaly     No rebound tenderness noted; bowel sounds normal     Pulsatile aortic mass is not palpable.      Extremities:    2+ femoral pulses bilaterally                                                2+ pedal pulses palpable.                                               No pedal edema                                               Edema/leg swelling:  now L > R 2-3+ pitting                                               Hyperpigmentation: bilateral leg.                                               Healed R medial; active R lateral ulcers; R dorsal ulcers    LAB RESULTS:  Lab Results   Component Value Date    K 3.7 2020    K 3.8 2020    K 3.7 2020    CREATININE 0.7  09/08/2020    CREATININE 0.8 08/06/2020    CREATININE 0.8 05/04/2020     Lab Results   Component Value Date    WBC 3.62 (L) 09/08/2020    WBC 5.47 08/26/2020    WBC 5.10 08/06/2020    WBC 5.10 08/06/2020    HCT 38.0 09/08/2020    HCT 38.5 08/26/2020    HCT 40.1 08/06/2020    HCT 40.1 08/06/2020     09/08/2020     08/26/2020     08/06/2020     08/06/2020     No results found for: HGBA1C  IMAGING:  U/S: closed R GSV   No DVT    Prior:  Venous U/S:  R GSV: 5.4 mm  L GSV: 5.8 mm    R SSV: 4.2 mm  L SSV:3.9 mm  No DVT    IMP/PLAN:  68 y.o. female withh/o scleroderma (recent rx with iv veletri / prostaglandin) with venous ulcers, these began in February 2015; healed in summer 2015; now Chronic venous insufficiency - CEAP C6 R lateral ulcer, C5 R medial ulcer  Doing well s/p R GSV evlt - much less edema than before  Still with 2-3+ pitting edema L > R now    More ambulatory now  Plan for R LSV elvt    Timmy Claudio MD FACS DFSVS  Vascular/Endovascular Surgery    The EVLT procedure will be done as an ambulatory procedure in the office / procedure room under sterile conditions with local anesthesia.Venous U/S:

## 2020-11-04 ENCOUNTER — EMERGENCY (EMERGENCY)
Facility: HOSPITAL | Age: 50
LOS: 0 days | Discharge: HOME | End: 2020-11-04
Attending: EMERGENCY MEDICINE | Admitting: EMERGENCY MEDICINE
Payer: COMMERCIAL

## 2020-11-04 VITALS
DIASTOLIC BLOOD PRESSURE: 95 MMHG | TEMPERATURE: 98 F | HEART RATE: 84 BPM | SYSTOLIC BLOOD PRESSURE: 183 MMHG | HEIGHT: 70 IN | OXYGEN SATURATION: 98 % | RESPIRATION RATE: 18 BRPM

## 2020-11-04 VITALS — DIASTOLIC BLOOD PRESSURE: 72 MMHG | HEART RATE: 68 BPM | SYSTOLIC BLOOD PRESSURE: 151 MMHG

## 2020-11-04 DIAGNOSIS — R23.2 FLUSHING: ICD-10-CM

## 2020-11-04 DIAGNOSIS — Z86.19 PERSONAL HISTORY OF OTHER INFECTIOUS AND PARASITIC DISEASES: ICD-10-CM

## 2020-11-04 DIAGNOSIS — I10 ESSENTIAL (PRIMARY) HYPERTENSION: ICD-10-CM

## 2020-11-04 LAB
ALBUMIN SERPL ELPH-MCNC: 4.5 G/DL — SIGNIFICANT CHANGE UP (ref 3.5–5.2)
ALP SERPL-CCNC: 48 U/L — SIGNIFICANT CHANGE UP (ref 30–115)
ALT FLD-CCNC: 31 U/L — SIGNIFICANT CHANGE UP (ref 0–41)
ANION GAP SERPL CALC-SCNC: 12 MMOL/L — SIGNIFICANT CHANGE UP (ref 7–14)
AST SERPL-CCNC: 28 U/L — SIGNIFICANT CHANGE UP (ref 0–41)
BASOPHILS # BLD AUTO: 0.08 K/UL — SIGNIFICANT CHANGE UP (ref 0–0.2)
BASOPHILS NFR BLD AUTO: 0.7 % — SIGNIFICANT CHANGE UP (ref 0–1)
BILIRUB SERPL-MCNC: 0.9 MG/DL — SIGNIFICANT CHANGE UP (ref 0.2–1.2)
BUN SERPL-MCNC: 18 MG/DL — SIGNIFICANT CHANGE UP (ref 10–20)
CALCIUM SERPL-MCNC: 9.8 MG/DL — SIGNIFICANT CHANGE UP (ref 8.5–10.1)
CHLORIDE SERPL-SCNC: 100 MMOL/L — SIGNIFICANT CHANGE UP (ref 98–110)
CO2 SERPL-SCNC: 26 MMOL/L — SIGNIFICANT CHANGE UP (ref 17–32)
CREAT SERPL-MCNC: 0.8 MG/DL — SIGNIFICANT CHANGE UP (ref 0.7–1.5)
EOSINOPHIL # BLD AUTO: 0.13 K/UL — SIGNIFICANT CHANGE UP (ref 0–0.7)
EOSINOPHIL NFR BLD AUTO: 1.2 % — SIGNIFICANT CHANGE UP (ref 0–8)
GLUCOSE SERPL-MCNC: 95 MG/DL — SIGNIFICANT CHANGE UP (ref 70–99)
HCT VFR BLD CALC: 40.6 % — SIGNIFICANT CHANGE UP (ref 37–47)
HGB BLD-MCNC: 13.6 G/DL — SIGNIFICANT CHANGE UP (ref 12–16)
IMM GRANULOCYTES NFR BLD AUTO: 0.3 % — SIGNIFICANT CHANGE UP (ref 0.1–0.3)
LYMPHOCYTES # BLD AUTO: 28.9 % — SIGNIFICANT CHANGE UP (ref 20.5–51.1)
LYMPHOCYTES # BLD AUTO: 3.19 K/UL — SIGNIFICANT CHANGE UP (ref 1.2–3.4)
MAGNESIUM SERPL-MCNC: 2 MG/DL — SIGNIFICANT CHANGE UP (ref 1.8–2.4)
MCHC RBC-ENTMCNC: 27.9 PG — SIGNIFICANT CHANGE UP (ref 27–31)
MCHC RBC-ENTMCNC: 33.5 G/DL — SIGNIFICANT CHANGE UP (ref 32–37)
MCV RBC AUTO: 83.2 FL — SIGNIFICANT CHANGE UP (ref 81–99)
MONOCYTES # BLD AUTO: 0.63 K/UL — HIGH (ref 0.1–0.6)
MONOCYTES NFR BLD AUTO: 5.7 % — SIGNIFICANT CHANGE UP (ref 1.7–9.3)
NEUTROPHILS # BLD AUTO: 6.98 K/UL — HIGH (ref 1.4–6.5)
NEUTROPHILS NFR BLD AUTO: 63.2 % — SIGNIFICANT CHANGE UP (ref 42.2–75.2)
NRBC # BLD: 0 /100 WBCS — SIGNIFICANT CHANGE UP (ref 0–0)
PLATELET # BLD AUTO: 258 K/UL — SIGNIFICANT CHANGE UP (ref 130–400)
POTASSIUM SERPL-MCNC: 3.8 MMOL/L — SIGNIFICANT CHANGE UP (ref 3.5–5)
POTASSIUM SERPL-SCNC: 3.8 MMOL/L — SIGNIFICANT CHANGE UP (ref 3.5–5)
PROT SERPL-MCNC: 7.5 G/DL — SIGNIFICANT CHANGE UP (ref 6–8)
RBC # BLD: 4.88 M/UL — SIGNIFICANT CHANGE UP (ref 4.2–5.4)
RBC # FLD: 12.4 % — SIGNIFICANT CHANGE UP (ref 11.5–14.5)
SODIUM SERPL-SCNC: 138 MMOL/L — SIGNIFICANT CHANGE UP (ref 135–146)
WBC # BLD: 11.04 K/UL — HIGH (ref 4.8–10.8)
WBC # FLD AUTO: 11.04 K/UL — HIGH (ref 4.8–10.8)

## 2020-11-04 PROCEDURE — 93010 ELECTROCARDIOGRAM REPORT: CPT

## 2020-11-04 PROCEDURE — 99284 EMERGENCY DEPT VISIT MOD MDM: CPT

## 2020-11-04 RX ORDER — SODIUM CHLORIDE 9 MG/ML
1000 INJECTION, SOLUTION INTRAVENOUS ONCE
Refills: 0 | Status: COMPLETED | OUTPATIENT
Start: 2020-11-04 | End: 2020-11-04

## 2020-11-04 RX ADMIN — SODIUM CHLORIDE 1000 MILLILITER(S): 9 INJECTION, SOLUTION INTRAVENOUS at 18:31

## 2020-11-04 NOTE — ED PROVIDER NOTE - NS ED ROS FT
CONST: see HPI  EYES: No pain, redness, drainage or visual changes.  ENT: No ear pain or discharge, nasal discharge or congestion. No sore throat  CARD: No chest pain, palpitations  RESP: No SOB, cough, hemoptysis. No hx of asthma or COPD  GI: No abdominal pain, N/V/D  MS: No joint pain, back pain or extremity pain/injury  SKIN: No rashes  ENDO: dry mouth, excessive thirst  NEURO: No headache, dizziness, paresthesias or LOC

## 2020-11-04 NOTE — ED ADULT NURSE NOTE - OBJECTIVE STATEMENT
pt send in by pmd for abnormal ekg. pt states she was at work earlier today and felt flushed  & went to [facility] where they checked her bp and was found to be high, pt took bp meds this morning. pt states she started drinking a protein shake which she just noticed had over 2gms of sodium which could be raising her bp .

## 2020-11-04 NOTE — ED ADULT TRIAGE NOTE - CHIEF COMPLAINT QUOTE
Pt went to her PMD c/o SOB, elevated BP, and feeling "warm", and frequent urination and excessive thirst. EKG done in PMD office was abnormal. Pt c/o pain to L neck.  FS in triage Pt went to her PMD c/o SOB, elevated BP, and feeling "warm", and frequent urination and excessive thirst. EKG done in PMD office was abnormal. Pt c/o pain to L neck.  FS in triage 102 mg/dl. Pt went to her PMD c/o SOB, elevated BP, and feeling "warm", and frequent urination and excessive thirst. EKG done in PMD office was abnormal. Pt c/o pain to L neck.  FS in triage 102 mg/dl. Pt was given Aspirin 324 mg in PMD office pta.

## 2020-11-04 NOTE — ED PROVIDER NOTE - PATIENT PORTAL LINK FT
You can access the FollowMyHealth Patient Portal offered by [facility] by registering at the following website: [email or web address]. By joining [employer]’s FollowMyHealth portal, you will also be able to view your health information using other applications (apps) compatible with our system.

## 2020-11-04 NOTE — ED ADULT NURSE NOTE - CHIEF COMPLAINT QUOTE
Pt went to her PMD c/o SOB, elevated BP, and feeling "warm", and frequent urination and excessive thirst. EKG done in PMD office was abnormal. Pt c/o pain to L neck.  FS in triage 102 mg/dl. Pt was given Aspirin 324 mg in PMD office pta.

## 2020-11-04 NOTE — ED PROVIDER NOTE - PROGRESS NOTE DETAILS
The patient was given detailed return precautions and advised to return to the emergency department if any new symptoms developed, symptoms worsened or for any concerns. The patient was offered the opportunity to ask questions and verbalized that they understand the diagnosis and discharge instructions.  Pt to f/u TSH with PMD

## 2020-11-04 NOTE — ED PROVIDER NOTE - NSFOLLOWUPINSTRUCTIONS_ED_ALL_ED_FT
PLEASE AVOID EXCESS SODIUM. DRINK 8 CUPS OF WATER DAILY.    PLEASE FOLLOW UP TSH LEVEL WITH YOUR PRIMARY CARE PROVIDER OR EMPLOYEE HEALTH

## 2020-11-04 NOTE — ED PROVIDER NOTE - ATTENDING CONTRIBUTION TO CARE
51 y/o female with h/o htn in ER for eval.  Pt states she was at work earlier today and while eating lunch her face felt warm and flushed, felt a little light headed, but no syncope/near syncope.  no HA.  no cp/sob.  no palpitations.  no abd pain.  no n/v/d.  no LE pain/swelling.  no urinary symptoms, pt is post-menopausal x 3 yrs.  Pt went to [employer] and told her bp was elevated, then went to f/u with her pmd and was sent to ER for eval.  Pt states she is feeling much better now, no symptoms.    PE - nad, nc/at, eomi, perrl, op - clear, cta b/l, no w/r/r, rrr, abd - soft, nt/nd, nabs, from x 4, no LE swelling/tenderness, A&O x 3, no focal neuro deficits.  -ivf, check labs, ekg.

## 2020-11-04 NOTE — ED PROVIDER NOTE - OBJECTIVE STATEMENT
49yo female [employer] employee with PMHx HTN, Covid+ 8/20, presents c/o feeling "flushed" while at work this AM after eating lunch. Pt reported a feeling of warmth rushing into her face, without relieving factors. She went to employee health and was told BP was elevated and was sent home. Pt then drove to her PMD's office, underwent EKG and FS, and was told her EKG was "abnormal," and advised to come to ED. Pt was seen in August for presumed vasovagal syncope at that time and states had echo done and subsequent PMD f/u which was unremarkable. She is without CP, SOB, dizziness, lightheadedness. She is post-menopausal 3 yrs.

## 2020-11-04 NOTE — ED PROVIDER NOTE - CLINICAL SUMMARY MEDICAL DECISION MAKING FREE TEXT BOX
labs ok, ekg with no acute ischemic changes.  pt given ivf, no symptoms while in ER.  tft's sent and pending,  to d/c home and pt to f/u with pmd, told to return to ER if she feels worse or for any other new/concerning symptoms.  pt understands and agrees with plan.

## 2020-11-04 NOTE — ED PROVIDER NOTE - PHYSICAL EXAMINATION
CONST: Well appearing in NAD  EYES: PERRL, EOMI, Sclera and conjunctiva clear.  ENT: No nasal discharge.   NECK: Non-tender  CARD: Normal S1 S2; Normal rate and rhythm  RESP: Equal BS B/L, No wheezes, rhonchi or rales. No distress  GI: Soft, non-tender, non-distended.  MS: Normal ROM in all extremities. No midline spinal tenderness.  SKIN: Warm, dry, no acute rashes. Good turgor  NEURO: A&Ox3, No focal deficits. Strength 5/5 with no sensory deficits. Steady gait

## 2020-11-05 ENCOUNTER — TRANSCRIPTION ENCOUNTER (OUTPATIENT)
Age: 50
End: 2020-11-05

## 2020-11-05 LAB — TSH SERPL-MCNC: 0.64 UIU/ML — SIGNIFICANT CHANGE UP (ref 0.27–4.2)

## 2022-07-07 ENCOUNTER — EMERGENCY (EMERGENCY)
Facility: HOSPITAL | Age: 52
LOS: 0 days | Discharge: HOME | End: 2022-07-07
Attending: EMERGENCY MEDICINE | Admitting: EMERGENCY MEDICINE

## 2022-07-07 VITALS — HEART RATE: 75 BPM | DIASTOLIC BLOOD PRESSURE: 91 MMHG | SYSTOLIC BLOOD PRESSURE: 190 MMHG

## 2022-07-07 VITALS
OXYGEN SATURATION: 98 % | RESPIRATION RATE: 20 BRPM | HEART RATE: 77 BPM | DIASTOLIC BLOOD PRESSURE: 96 MMHG | HEIGHT: 70 IN | SYSTOLIC BLOOD PRESSURE: 202 MMHG | TEMPERATURE: 97 F | WEIGHT: 250 LBS

## 2022-07-07 DIAGNOSIS — I10 ESSENTIAL (PRIMARY) HYPERTENSION: ICD-10-CM

## 2022-07-07 DIAGNOSIS — R42 DIZZINESS AND GIDDINESS: ICD-10-CM

## 2022-07-07 DIAGNOSIS — R00.2 PALPITATIONS: ICD-10-CM

## 2022-07-07 DIAGNOSIS — R20.2 PARESTHESIA OF SKIN: ICD-10-CM

## 2022-07-07 PROCEDURE — 93010 ELECTROCARDIOGRAM REPORT: CPT

## 2022-07-07 PROCEDURE — 99284 EMERGENCY DEPT VISIT MOD MDM: CPT

## 2022-07-07 NOTE — ED PROVIDER NOTE - PHYSICAL EXAMINATION
VITAL SIGNS: I have reviewed nursing notes and confirm.  CONSTITUTIONAL: Well-developed; well-nourished; in no acute distress.   SKIN:  skin exam is warm and dry, no acute rash.    HEAD: Normocephalic; atraumatic.  EYES:  conjunctiva and sclera clear.  ENT: No nasal discharge; airway clear.  CARD: S1, S2 normal; no murmurs, gallops, or rubs. Regular rate and rhythm.   RESP: No wheezes, rales or rhonchi.  EXT: Normal ROM.  No clubbing, cyanosis or edema.   NEURO: Alert, oriented, grossly unremarkable

## 2022-07-07 NOTE — ED PROVIDER NOTE - PATIENT PORTAL LINK FT
You can access the FollowMyHealth Patient Portal offered by [facility] by registering at the following website: [email or web address]. By joining [employer]’s FollowMyHealth portal, you will also be able to view your health information using other applications (apps) compatible with our system.

## 2022-07-07 NOTE — ED ADULT NURSE NOTE - NSIMPLEMENTINTERV_GEN_ALL_ED
Implemented All Universal Safety Interventions:  [place] to call system. Call bell, personal items and telephone within reach. Instruct patient to call for assistance. Room bathroom lighting operational. Non-slip footwear when patient is off stretcher. Physically safe environment: no spills, clutter or unnecessary equipment. Stretcher in lowest position, wheels locked, appropriate side rails in place.

## 2022-07-07 NOTE — ED PROVIDER NOTE - NS ED ROS FT
Cardiac: + palpitations No chest pain, SOB or edema  Respiratory:  No cough or respiratory distress. No hemoptysis. No history of asthma or RAD.  GI:  No nausea, vomiting, diarrhea or abdominal pain.  MS:  No myalgia, muscle weakness, joint pain or back pain.  Neuro:  + lightheadedness No headache or weakness.  No LOC.  Skin:  No skin rash.   Endocrine: No history of thyroid disease or diabetes.  Except as documented in the HPI,  all other systems are negative.

## 2022-07-07 NOTE — ED PROVIDER NOTE - NSFOLLOWUPINSTRUCTIONS_ED_ALL_ED_FT
Our Emergency Department Referral Coordinators will be reaching out ot you in the next 24-48 hours from 9:00am to 5:00pm (Monday to Friday) with a follow up appointment. Please expect a phone call from the hospital in that time frame. If you do not receive a call or if you have any questions or concerns, you can reach them at [phone] or [phone].

## 2022-07-07 NOTE — ED PROVIDER NOTE - OBJECTIVE STATEMENT
Patient is a 52-year-old female with a past medical history of hypertension here evaluation of lightheadedness upon standing with associated left-sided facial and palpitations which lasted for approximately 10 minutes, resolved after eating breakfast. Pt denies fever, chills, CP, SOB, N/V/D.

## 2022-07-07 NOTE — ED PROVIDER NOTE - ATTENDING APP SHARED VISIT CONTRIBUTION OF CARE
52-year-old female history of HTN (on valsartan, did not take dose of her medication yesterday), p/w dizziness/lightheadedness and palpitations today.  She states that at approximately 1100 a.m. she was seated and then stood up, felt slightly off and dizzy/lightheaded, lasted approximately 10 minutes, subsequently experienced left facial tingling and palpitations (described as fast and irregular) this lasted for approximately 10 minutes, symptoms have all now resolved, denies exertional chest pain, fever, nausea, vomiting, diaphoresis, hemoptysis, SOB, orthopnea, PND, LE pain or swelling, recent immobilization or travel or other associated complaints at present.    Old chart reviewed.  I have reviewed and agree with the initial nursing note, except as documented in my note.    VSS, awake, alert, non-toxic appearing, oropharynx clear, mmm, no JVD or bruit, chest CTAB, non-labored breathing, no w/r/r, +S1/S2, RRR, no m/r/g, abdomen soft, NT, ND, +BS, no organomegaly or pulsatile masses, no peripheral edema or deformities, equal pulses upper and lower extremities, alert, clear speech, CM II-XII intact, coordination normal and steady gait.

## 2022-07-07 NOTE — ED ADULT TRIAGE NOTE - CHIEF COMPLAINT QUOTE
Pt was working, began c/o palpitations, dizziness, and elevated BP, fs in triage 119, no neuro deficits

## 2022-07-07 NOTE — ED PROVIDER NOTE - NS ED ATTENDING STATEMENT MOD
This was a shared visit with the YN[facility]. I reviewed and verified the documentation and independently performed the documented:

## 2022-07-19 ENCOUNTER — APPOINTMENT (OUTPATIENT)
Dept: CARDIOLOGY | Facility: CLINIC | Age: 52
End: 2022-07-19

## 2022-07-19 VITALS
BODY MASS INDEX: 39.24 KG/M2 | HEART RATE: 78 BPM | OXYGEN SATURATION: 98 % | WEIGHT: 250 LBS | RESPIRATION RATE: 16 BRPM | DIASTOLIC BLOOD PRESSURE: 80 MMHG | SYSTOLIC BLOOD PRESSURE: 135 MMHG | TEMPERATURE: 97.2 F | HEIGHT: 67 IN

## 2022-07-19 DIAGNOSIS — Z87.891 PERSONAL HISTORY OF NICOTINE DEPENDENCE: ICD-10-CM

## 2022-07-19 DIAGNOSIS — L97.108: ICD-10-CM

## 2022-07-19 DIAGNOSIS — I83.001: ICD-10-CM

## 2022-07-19 PROCEDURE — 99204 OFFICE O/P NEW MOD 45 MIN: CPT

## 2022-07-19 PROCEDURE — 93000 ELECTROCARDIOGRAM COMPLETE: CPT

## 2022-07-20 NOTE — ASSESSMENT
[FreeTextEntry1] : 51 yo female with pmhx and presentation as above\par Likely arrhythmogenic symptoms\par MCOT\par Stress echo\par Avoid caffeine \par Stress coping mechanisms discussed\par Labs reviewed, low cholesterol diet discussed\par aggressive risk modif\par act as tolerated\par rtc 3-4 weeks for stress echo

## 2022-07-20 NOTE — REVIEW OF SYSTEMS
[Feeling Fatigued] : feeling fatigued [Under Stress] : under stress [Negative] : Heme/Lymph [FreeTextEntry5] : see hpi

## 2022-07-20 NOTE — HISTORY OF PRESENT ILLNESS
[FreeTextEntry1] : 51 yo female with pmhx as below is here for her initial eval\par recurrent palpitations, on and off last few months\par ? " panic attacks" as per pt\par + chest tightness\par fong with mod effort\par on and off le edema

## 2022-08-04 NOTE — ED ADULT TRIAGE NOTE - WEIGHT METHOD
Patient just got a 3 month supply of the 30mg tablets. He would like the 10mg tablets called in to go with the 30mg for now.     Future appt 8/26/2022    Last appt 4-19-22 stated

## 2022-08-16 ENCOUNTER — APPOINTMENT (OUTPATIENT)
Dept: CARDIOLOGY | Facility: CLINIC | Age: 52
End: 2022-08-16

## 2022-08-16 PROCEDURE — 99214 OFFICE O/P EST MOD 30 MIN: CPT | Mod: 25

## 2022-08-16 PROCEDURE — 93306 TTE W/DOPPLER COMPLETE: CPT

## 2022-08-16 NOTE — ASSESSMENT
[FreeTextEntry1] : 53 yo female with pmhx and presentation as above\par MCOT NSVT at > 200 bpm\par echo - nl lv fnx\par set up for cath\par if no cad - EP eval\par Stress coping mechanisms discussed\par add low dose bb\par cont asa and bp meds\par aggressive risk modif\par act as tolerated\par rtc 3-4 weeks

## 2022-08-16 NOTE — HISTORY OF PRESENT ILLNESS
[FreeTextEntry1] : 53 yo female with pmhx as below is here for a f/up visit\par initial eval recurrent palpitations, on and off last few months\par as well as chest tightness\par fong with mod effort\par on and off le edema\par s/p echo/MCOT

## 2022-08-25 LAB
ANION GAP SERPL CALC-SCNC: 15 MMOL/L
BASOPHILS # BLD AUTO: 0.08 K/UL
BASOPHILS NFR BLD AUTO: 0.9 %
BUN SERPL-MCNC: 18 MG/DL
CALCIUM SERPL-MCNC: 10.1 MG/DL
CHLORIDE SERPL-SCNC: 99 MMOL/L
CO2 SERPL-SCNC: 27 MMOL/L
CREAT SERPL-MCNC: 0.86 MG/DL
EGFR: 81 ML/MIN/1.73M2
EOSINOPHIL # BLD AUTO: 0.17 K/UL
EOSINOPHIL NFR BLD AUTO: 1.9 %
GLUCOSE SERPL-MCNC: 100 MG/DL
HCT VFR BLD CALC: 46.4 %
HGB BLD-MCNC: 15.1 G/DL
IMM GRANULOCYTES NFR BLD AUTO: 0.1 %
LYMPHOCYTES # BLD AUTO: 3.49 K/UL
LYMPHOCYTES NFR BLD AUTO: 38.2 %
MAN DIFF?: NORMAL
MCHC RBC-ENTMCNC: 28.1 PG
MCHC RBC-ENTMCNC: 32.5 GM/DL
MCV RBC AUTO: 86.4 FL
MONOCYTES # BLD AUTO: 0.61 K/UL
MONOCYTES NFR BLD AUTO: 6.7 %
NEUTROPHILS # BLD AUTO: 4.77 K/UL
NEUTROPHILS NFR BLD AUTO: 52.2 %
PLATELET # BLD AUTO: 268 K/UL
POTASSIUM SERPL-SCNC: 4.5 MMOL/L
RBC # BLD: 5.37 M/UL
RBC # FLD: 13.1 %
SODIUM SERPL-SCNC: 140 MMOL/L
WBC # FLD AUTO: 9.13 K/UL

## 2022-08-29 ENCOUNTER — LABORATORY RESULT (OUTPATIENT)
Age: 52
End: 2022-08-29

## 2022-09-01 ENCOUNTER — OUTPATIENT (OUTPATIENT)
Dept: OUTPATIENT SERVICES | Facility: HOSPITAL | Age: 52
LOS: 1 days | Discharge: HOME | End: 2022-09-01

## 2022-09-01 VITALS — DIASTOLIC BLOOD PRESSURE: 72 MMHG | HEART RATE: 59 BPM | SYSTOLIC BLOOD PRESSURE: 135 MMHG | OXYGEN SATURATION: 97 %

## 2022-09-01 LAB
ANION GAP SERPL CALC-SCNC: 9 MMOL/L — SIGNIFICANT CHANGE UP (ref 7–14)
BUN SERPL-MCNC: 17 MG/DL — SIGNIFICANT CHANGE UP (ref 10–20)
CALCIUM SERPL-MCNC: 9.5 MG/DL — SIGNIFICANT CHANGE UP (ref 8.5–10.1)
CHLORIDE SERPL-SCNC: 103 MMOL/L — SIGNIFICANT CHANGE UP (ref 98–110)
CO2 SERPL-SCNC: 30 MMOL/L — SIGNIFICANT CHANGE UP (ref 17–32)
CREAT SERPL-MCNC: 0.8 MG/DL — SIGNIFICANT CHANGE UP (ref 0.7–1.5)
EGFR: 89 ML/MIN/1.73M2 — SIGNIFICANT CHANGE UP
GLUCOSE SERPL-MCNC: 105 MG/DL — HIGH (ref 70–99)
HCT VFR BLD CALC: 45.2 % — SIGNIFICANT CHANGE UP (ref 37–47)
HGB BLD-MCNC: 15.5 G/DL — SIGNIFICANT CHANGE UP (ref 12–16)
MCHC RBC-ENTMCNC: 27.6 PG — SIGNIFICANT CHANGE UP (ref 27–31)
MCHC RBC-ENTMCNC: 34.3 G/DL — SIGNIFICANT CHANGE UP (ref 32–37)
MCV RBC AUTO: 80.4 FL — LOW (ref 81–99)
NRBC # BLD: 0 /100 WBCS — SIGNIFICANT CHANGE UP (ref 0–0)
PLATELET # BLD AUTO: 264 K/UL — SIGNIFICANT CHANGE UP (ref 130–400)
POTASSIUM SERPL-MCNC: 4 MMOL/L — SIGNIFICANT CHANGE UP (ref 3.5–5)
POTASSIUM SERPL-SCNC: 4 MMOL/L — SIGNIFICANT CHANGE UP (ref 3.5–5)
RBC # BLD: 5.62 M/UL — HIGH (ref 4.2–5.4)
RBC # FLD: 12.9 % — SIGNIFICANT CHANGE UP (ref 11.5–14.5)
SODIUM SERPL-SCNC: 142 MMOL/L — SIGNIFICANT CHANGE UP (ref 135–146)
WBC # BLD: 7.41 K/UL — SIGNIFICANT CHANGE UP (ref 4.8–10.8)
WBC # FLD AUTO: 7.41 K/UL — SIGNIFICANT CHANGE UP (ref 4.8–10.8)

## 2022-09-01 PROCEDURE — 93458 L HRT ARTERY/VENTRICLE ANGIO: CPT | Mod: 26

## 2022-09-01 RX ORDER — METOPROLOL TARTRATE 50 MG
1 TABLET ORAL
Qty: 30 | Refills: 0
Start: 2022-09-01 | End: 2022-09-30

## 2022-09-01 RX ORDER — METOPROLOL TARTRATE 50 MG
1 TABLET ORAL
Qty: 0 | Refills: 0 | DISCHARGE

## 2022-09-01 RX ORDER — ATORVASTATIN CALCIUM 80 MG/1
1 TABLET, FILM COATED ORAL
Qty: 30 | Refills: 0
Start: 2022-09-01 | End: 2022-09-30

## 2022-09-01 NOTE — H&P CARDIOLOGY - HISTORY OF PRESENT ILLNESS
53 y/o female PMHx: HTN, NSVT on MCOT; PSHx vein stripping, presents here today for [facility] d/t complaint of palpitations accompanied with chest tightness and episode of NSVT.     Pre cath note:    indication:  [ ] STEMI                [ ] NSTEMI                 [ ] Acute coronary syndrome                     [ ]Unstable Angina   [ ] high risk  [ ] intermediate risk  [ ] low risk                     [ ] Stable Angina     non-invasive testing:                          Date:                     result: [ ] high risk  [ ] intermediate risk  [ ] low risk    Anti- Anginal medications:                    [ ] not used                       [x ] used                   [ ] not used but strong indication not to use    Ejection Fraction                   [ ] <29            [ ] 30-39%   [ ] 40-49%     [ x]>50%    CHF                   [ ] active (within last 14 days on meds   [ ] Chronic (on meds but no exacerbation)    COPD                   [ ] mild (on chronic bronchodilators)  [ ] moderate (on chronic steroid therapy)      [ ] severe (indication for home O2 or PACO2 >50)    Other risk factors:                       [ ] Previous MI                     [ ] CVA/ stroke                    [ ] carotid stent/ CEA                    [ ] PVD/PAD- (arterial aneurysm, non-palpable pulses, tortuous vessel with inability to insert catheter, infra-renal dissection, renal or subclavian artery stenosis)                    [ ] diabetic                    [ ] previous CABG                    [ ] Renal Failure       Right Jonathan Test: WNL    Adjusted Cath Bleeding Risk: 1.5%

## 2022-09-01 NOTE — H&P CARDIOLOGY - NSICDXPASTMEDICALHX_GEN_ALL_CORE_FT
PAST MEDICAL HISTORY:  Dyslipidemia     HTN (hypertension)     PVC (premature ventricular contraction)     SVT (supraventricular tachycardia)     Varicose veins

## 2022-09-01 NOTE — ASU PATIENT PROFILE, ADULT - FALL HARM RISK - RISK INTERVENTIONS

## 2022-09-01 NOTE — CHART NOTE - NSCHARTNOTEFT_GEN_A_CORE
***INCOMPLETE NOTE         PRE-OP DIAGNOSIS:  Stable angina, NSVT      PROCEDURE:     [x] Coronary Angiogram     [x] LHC     [] LVG     [] RHC     [] Intervention (see below)         PHYSICIAN:  Dr. Lloyd     ASSISTANT:  Dr. [place], Dr. CATHRYN Locke       PROCEDURE DESCRIPTION:     Consent:      [x] Patient     [] Family Member     []  Used        Anesthesia:     [] General     [x] Sedation     [x] Local        Access & Closure:     [x] 6Fr right Radial Artery; D-STAT      [] Fr Femoral Artery     [] Fr Femoral Vein     [] Fr Brachial Vein       IV Contrast: 60 mL        Intervention: None       Implants: None        FINDINGS:     Coronary Dominance: Left      LM: normal     LAD: mild disease     CX: mild disease     Ramus:     RCA: mild disease          LVEDP: mmHg     EF: %        ESTIMATED BLOOD LOSS: < 10 mL        CONDITION:     [x] Good     [] Fair     [] Critical        SPECIMEN REMOVED: N/A       POST-OP DIAGNOSIS:      [] Normal Coronary Angiogram     [x] Mild Coronary Artery Disease (< 50% stenosis)     [] __ Vessel Coronary Artery Disease        PLAN OF CARE:     [x] D/C Home Today     [] Return to In-patient bed     [] Admit for observation     [] Return for Staged Procedure     [] CT Surgery Consult     [x] Medications:   - c/w ASA, metoprolol  - c/w losartan-HCTZ  - start atorvastatin     [x] IV Fluids: start NS@150 for 3 hours          ***INCOMPLETE NOTE PRE-OP DIAGNOSIS:  Stable angina, NSVT      PROCEDURE:     [x] Coronary Angiogram     [x] LHC     [] LVG     [] RHC     [] Intervention (see below)         PHYSICIAN:  Dr. Lloyd     ASSISTANT:  Dr. CATHRYN Locke       PROCEDURE DESCRIPTION:     Consent:      [x] Patient     [] Family Member     []  Used        Anesthesia:     [] General     [x] Sedation     [x] Local        Access & Closure:     [x] 6Fr right Radial Artery; D-STAT      [] Fr Femoral Artery     [] Fr Femoral Vein     [] Fr Brachial Vein       IV Contrast: 60 mL        Intervention: None       Implants: None        FINDINGS:     Coronary Dominance: Right       LM: normal     LAD: mild disease   D1: large vessel with 80% ostial stenosis; ABDIRIZAK 3    CX: mild disease; distal Cx 40% lesion     RCA: mild disease; mid RCA 40% lesion          LVEDP: 10 mmHg          ESTIMATED BLOOD LOSS: < 10 mL        CONDITION:     [x] Good     [] Fair     [] Critical        SPECIMEN REMOVED: N/A       POST-OP DIAGNOSIS:      [] Normal Coronary Angiogram     [] Mild Coronary Artery Disease (< 50% stenosis)     [x 1__ Vessel Coronary Artery Disease        PLAN OF CARE:     [x] D/C Home Today     [] Return to In-patient bed     [] Admit for observation     [] Return for Staged Procedure     [] CT Surgery Consult     [x] Medications:   - c/w ASA  - c/w metoprolol (target HR 60-70)  - c/w losartan-HCTZ  - start atorvastatin 80mg daily     [x] IV Fluids: start NS@150 for 3 hours PRE-OP DIAGNOSIS:  Stable angina, NSVT      PROCEDURE:     [x] Coronary Angiogram     [x] LHC     [] LVG     [] RHC     [] Intervention (see below)         PHYSICIAN:  Dr. Lloyd     ASSISTANT:  Dr. CATHRYN Locke       PROCEDURE DESCRIPTION:     Consent:      [x] Patient     [] Family Member     []  Used        Anesthesia:     [] General     [x] Sedation     [x] Local        Access & Closure:     [x] 6Fr right Radial Artery; D-STAT      [] Fr Femoral Artery     [] Fr Femoral Vein     [] Fr Brachial Vein       IV Contrast: 60 mL        Intervention: None       Implants: None        FINDINGS:     Coronary Dominance: Right       LM: normal     LAD: mild disease   D1: large vessel with 80% ostial stenosis; ABDIRIZAK 3    CX: mild disease; distal Cx 40% lesion     RCA: mild disease; mid RCA 40% lesion          LVEDP: 10 mmHg          ESTIMATED BLOOD LOSS: < 10 mL        CONDITION:     [x] Good     [] Fair     [] Critical        SPECIMEN REMOVED: N/A       POST-OP DIAGNOSIS:      [] Normal Coronary Angiogram     [] Mild Coronary Artery Disease (< 50% stenosis)     [x 1__ Vessel Coronary Artery Disease        PLAN OF CARE:     [x] D/C Home Today     [] Return to In-patient bed     [] Admit for observation     [] Return for Staged Procedure     [] CT Surgery Consult     [x] Medications:   - c/w ASA  - c/w metoprolol (target HR 60-70)  - c/w losartan-HCTZ  - rx lipids     [x] IV Fluids: start NS@150 for 3 hours

## 2022-09-02 PROBLEM — E78.5 HYPERLIPIDEMIA, UNSPECIFIED: Chronic | Status: ACTIVE | Noted: 2022-09-01

## 2022-09-02 PROBLEM — I49.3 VENTRICULAR PREMATURE DEPOLARIZATION: Chronic | Status: ACTIVE | Noted: 2022-09-01

## 2022-09-02 PROBLEM — I47.1 SUPRAVENTRICULAR TACHYCARDIA: Chronic | Status: ACTIVE | Noted: 2022-09-01

## 2022-09-02 PROBLEM — I83.90 ASYMPTOMATIC VARICOSE VEINS OF UNSPECIFIED LOWER EXTREMITY: Chronic | Status: ACTIVE | Noted: 2022-09-01

## 2022-09-06 DIAGNOSIS — Z79.899 OTHER LONG TERM (CURRENT) DRUG THERAPY: ICD-10-CM

## 2022-09-06 DIAGNOSIS — Z79.82 LONG TERM (CURRENT) USE OF ASPIRIN: ICD-10-CM

## 2022-09-06 DIAGNOSIS — I47.1 SUPRAVENTRICULAR TACHYCARDIA: ICD-10-CM

## 2022-09-06 DIAGNOSIS — I10 ESSENTIAL (PRIMARY) HYPERTENSION: ICD-10-CM

## 2022-09-06 DIAGNOSIS — E78.5 HYPERLIPIDEMIA, UNSPECIFIED: ICD-10-CM

## 2022-09-06 DIAGNOSIS — I49.3 VENTRICULAR PREMATURE DEPOLARIZATION: ICD-10-CM

## 2022-09-06 DIAGNOSIS — I20.8 OTHER FORMS OF ANGINA PECTORIS: ICD-10-CM

## 2022-09-06 DIAGNOSIS — I25.118 ATHEROSCLEROTIC HEART DISEASE OF NATIVE CORONARY ARTERY WITH OTHER FORMS OF ANGINA PECTORIS: ICD-10-CM

## 2022-09-30 ENCOUNTER — APPOINTMENT (OUTPATIENT)
Dept: CARDIOLOGY | Facility: CLINIC | Age: 52
End: 2022-09-30

## 2022-09-30 VITALS
DIASTOLIC BLOOD PRESSURE: 90 MMHG | BODY MASS INDEX: 39.08 KG/M2 | WEIGHT: 249 LBS | HEIGHT: 67 IN | HEART RATE: 74 BPM | SYSTOLIC BLOOD PRESSURE: 140 MMHG

## 2022-09-30 PROCEDURE — 93000 ELECTROCARDIOGRAM COMPLETE: CPT

## 2022-09-30 PROCEDURE — 99214 OFFICE O/P EST MOD 30 MIN: CPT

## 2022-09-30 NOTE — HISTORY OF PRESENT ILLNESS
[FreeTextEntry1] : 53 yo female with pmhx as below is here for a f/up visit\par initial eval recurrent palpitations, on and off last few months as well as chest tightness\par fong with mod effort\par on and off le edema\par s/p echo/MCOT - NSVT, followed by cath - mod to severe ostial diag ds\par bb was increased to 50\par still having recurrent palpitarions

## 2022-09-30 NOTE — ASSESSMENT
[FreeTextEntry1] : 53 yo female with pmhx and presentation as above\par MCOT NSVT at > 200 bpm\par echo - nl lv fnx\par cath - mod to severe diag ds\par cont all meds\par EP eval\par Stress coping mechanisms discussed\par aggressive risk modif\par act as tolerated\par rtc 3-4 months

## 2022-10-03 ENCOUNTER — APPOINTMENT (OUTPATIENT)
Dept: CARDIOLOGY | Facility: CLINIC | Age: 52
End: 2022-10-03

## 2022-10-03 VITALS
SYSTOLIC BLOOD PRESSURE: 122 MMHG | HEIGHT: 67 IN | DIASTOLIC BLOOD PRESSURE: 80 MMHG | BODY MASS INDEX: 39.08 KG/M2 | WEIGHT: 249 LBS | OXYGEN SATURATION: 98 % | HEART RATE: 68 BPM

## 2022-10-03 PROCEDURE — 99205 OFFICE O/P NEW HI 60 MIN: CPT

## 2022-10-03 PROCEDURE — 93000 ELECTROCARDIOGRAM COMPLETE: CPT

## 2022-10-03 PROCEDURE — ZZZZZ: CPT

## 2022-10-03 RX ORDER — ONDANSETRON 8 MG/1
8 TABLET, ORALLY DISINTEGRATING ORAL
Qty: 9 | Refills: 0 | Status: DISCONTINUED | COMMUNITY
Start: 2022-04-16 | End: 2022-10-03

## 2022-10-03 NOTE — ASSESSMENT
[FreeTextEntry1] : # Frequent PVCs\par - Cont Metoprolol. No PVCs on ECG today. \par - Exercise Stress to eval for exercise induced arrhythmias\par - Event monitor to assess PVC burden on Metoprolol\par - Cardiac MRI to assess for LGE/scar\par - Pulm referral to assess for sleep apnea\par - Cath with D1 ostial disease. \par - Discussed option of AAD vs ablation if necessary in the future pending above work up. \par \par # HTN\par - BP well controlled\par - Cont Metoprolol and Valsartan-HCTZ\par - 2g Na diet enforced\par \par I have also advised the patient to go to the nearest emergency room if she experiences any chest pain, dyspnea, syncope, or has any other compelling symptoms.\par \par Follow up in 6 weeks

## 2022-10-03 NOTE — HISTORY OF PRESENT ILLNESS
[FreeTextEntry1] : \par Cardiologist: Dr. Lloyd\par PCP: Dr. 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 First noticed palpitations about 13 years ago but thought they were panic attacks. She discussed it with her PCP. Over the summer, episodes started to get strong and would wake her up at night, thus prompting her to see Dr. Lloyd. Episodes have never lasted more than a few min. \par \par Of note, she also had dizziness, lightheadedness before seeing Dr. Lloyd but her BP was very high. Since having her BP better controlled, dizziness and lightheadedness improved. She states she had COVID in Aug 2020 and had some issues long term with being unable to catch  her breath.  She wore MCOT with Dr. Lloyd for 7 days. PVC burden was 12%. NSVT (longest 8 beats). Had a cath with 1v disease (D1 os - medically managed). Denies syncope.

## 2022-10-03 NOTE — PHYSICAL EXAM
[Well Developed] : well developed [Well Nourished] : well nourished [No Acute Distress] : no acute distress [Obese] : obese [Normal Conjunctiva] : normal conjunctiva [Normal Venous Pressure] : normal venous pressure [Normal S1, S2] : normal S1, S2 [No Murmur] : no murmur [Clear Lung Fields] : clear lung fields [Good Air Entry] : good air entry [No Respiratory Distress] : no respiratory distress  [Soft] : abdomen soft [Normal Gait] : normal gait [No Edema] : no edema [No Rash] : no rash [Moves all extremities] : moves all extremities [Normal Speech] : normal speech [Alert and Oriented] : alert and oriented [Normal memory] : normal memory

## 2022-10-03 NOTE — CARDIOLOGY SUMMARY
[de-identified] : 10/3/2022 NSR (HR 68 bm), No PVCs [de-identified] : 7/19-7/26/22 5 days analyzed, PVC burden 12.3%. NSVT (longest 8 beats).  [de-identified] : 8/16/2022 EF 55-60% Grade 1 DD. Mild LAE. Mild MR. Mild TR.  [de-identified] : 9/1/2022 2v disease (80% ostial D1). Normal LVEDP. Mild systemic HTN.

## 2022-10-03 NOTE — DISCUSSION/SUMMARY
[FreeTextEntry1] : I have recommended an event monitor to further evaluate her symptoms to determine if the symptoms may be related to a cardiac arrhythmia.  I educated the patient about the patient symptom activator feature and I have asked her to activate the event monitor whenever she has symptoms. \par \par In the meantime, I have asked her to seek immediate medical care if she has chest pain, shortness of breath or any other concerning symptoms. \par \par Follow up in 6-8 weeks to review results.  [EKG obtained to assist in diagnosis and management of assessed problem(s)] : EKG obtained to assist in diagnosis and management of assessed problem(s)

## 2022-10-12 ENCOUNTER — RX RENEWAL (OUTPATIENT)
Age: 52
End: 2022-10-12

## 2022-10-13 ENCOUNTER — APPOINTMENT (OUTPATIENT)
Dept: CARDIOLOGY | Facility: CLINIC | Age: 52
End: 2022-10-13

## 2022-11-16 ENCOUNTER — APPOINTMENT (OUTPATIENT)
Dept: CARDIOLOGY | Facility: CLINIC | Age: 52
End: 2022-11-16

## 2022-11-16 PROCEDURE — 93015 CV STRESS TEST SUPVJ I&R: CPT

## 2022-11-22 ENCOUNTER — APPOINTMENT (OUTPATIENT)
Dept: CARDIOLOGY | Facility: CLINIC | Age: 52
End: 2022-11-22

## 2022-11-22 PROCEDURE — 99215 OFFICE O/P EST HI 40 MIN: CPT

## 2022-11-22 PROCEDURE — 93000 ELECTROCARDIOGRAM COMPLETE: CPT | Mod: 59

## 2022-11-22 PROCEDURE — 93228 REMOTE 30 DAY ECG REV/REPORT: CPT

## 2022-11-22 RX ORDER — ALBUTEROL SULFATE 90 UG/1
108 (90 BASE) INHALANT RESPIRATORY (INHALATION)
Qty: 8 | Refills: 0 | Status: COMPLETED | COMMUNITY
Start: 2022-04-16 | End: 2022-11-22

## 2022-11-22 RX ORDER — ASPIRIN ENTERIC COATED TABLETS 81 MG 81 MG/1
81 TABLET, DELAYED RELEASE ORAL DAILY
Qty: 90 | Refills: 3 | Status: ACTIVE | COMMUNITY

## 2022-11-25 NOTE — REASON FOR VISIT
[Arrhythmia/ECG Abnorrmalities] : arrhythmia/ECG abnormalities [Other: ____] : [unfilled] [FreeTextEntry3] : Dr. Victor Manuel Lloyd - Dr. Marta Bradley

## 2022-11-25 NOTE — HISTORY OF PRESENT ILLNESS
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 First noticed palpitations about 13 years ago but thought they were panic attacks. She discussed it with her PCP. Over the summer, episodes started to get strong and would wake her up at night, thus prompting her to see Dr. Lloyd. Episodes have never lasted more than a few min. \par \par She had COVID in Aug 2020 and had some issues long term with being unable to catch  her breath.  She wore MCOT with Dr. Lloyd for 7 days. PVC burden was 12%. NSVT (longest 8 beats). Had a cath with 1v disease (D1 os - medically managed).\par \par CMR at [facility] showed mildly reduced EF with LVEF 44%, RVEF 57%\par \par repeat MCOT showed PVC burden 13%\par \par She has no chest pain, no shortness of breath, no dyspnea on exertion, no orthopnea, no PND. She denies dizziness, lightheadedness and syncope. She has no exertional symptoms. She presents for evaluation.\par \par

## 2022-11-25 NOTE — CARDIOLOGY SUMMARY
[de-identified] : (11/22/2022) ECG sinus rhythm at 80 bpm; frequent PVCs; PVC morphology superior axis, RBBB-like, R<S in V6, narrowish consistent with Left posterior fascicular PVC (differential posteromedial papillary muscle)\par 10/3/2022 NSR (HR 68 bm), No PVCs [de-identified] : (10/3/2022 to 11/1/2022) MCOT 30 days: PVC burden 13% - no VT, no AF, no SVT - Average HR 78 bpm, range  bpm.\par 7/19-7/26/22 5 days analyzed, PVC burden 12.3%. NSVT (longest 8 beats) [de-identified] : 8/16/2022 EF 55-60% Grade 1 DD. Mild LAE. Mild MR. Mild TR.  [de-identified] : (11/17/2022) CMR LVEF 44% [de-identified] : 9/1/2022 2v disease (80% ostial D1). Normal LVEDP. Mild systemic HTN.

## 2022-11-25 NOTE — DISCUSSION/SUMMARY
[EKG obtained to assist in diagnosis and management of assessed problem(s)] : EKG obtained to assist in diagnosis and management of assessed problem(s) [FreeTextEntry1] : # HTN\par - BP well controlled\par - Cont Metoprolol and Valsartan-HCTZ\par - 2g Na diet enforced\par \par # Frequent PVCs\par -PVC morphology superior axis, RBBB-like, R<S in V6, narrowish consistent with Left posterior fascicular PVC (differential posteromedial papillary muscle)\par - Cont Metoprolol\par - PVC burden still elevated at 13%on Metoprolol\par - Cardiac MRI showed no LGE, but LVEF 44%\par - Pulm referral to assess for sleep apnea\par - Cath with D1 ostial disease. \par - Discussed option of AAD vs ablation \par -I recommend PVC ablation due to LVEF 44% on CMR; A discussion of this procedure was discussed at length. She was informed that the procedure would be performed under moderate sedation and the procedure duration is about three hours. The success rate is 90-95% with a 5-10% recurrence. We also discussed possible complications, which include but are not limited to groin complications, bleeding, vascular injury, perforation, arrhythmias, AV block and the need for permanent pacemaker, cardiac tamponade, need for surgery, and rare risks of stroke/heart attack/death.\par \par Procedure will be planned on 2/8/2023 (I offered patient ablation date in Dec 2022 - asking after Holidays); RN will follow-up with patient on 12/5/2022; \par \par Approac (transeptal / Carto)\par \par She verbalized understanding of the discussion and all questions were addressed and answered. My  will be in contact with her for finalizing date, preadmission testing and instructions. Patient will follow with me in 2 months’ time. Please do not hesitate to contact me at [phone] if you have any further questions regarding this patient care.

## 2022-11-29 ENCOUNTER — APPOINTMENT (OUTPATIENT)
Dept: CARDIOLOGY | Facility: CLINIC | Age: 52
End: 2022-11-29

## 2022-11-29 VITALS
BODY MASS INDEX: 39.08 KG/M2 | SYSTOLIC BLOOD PRESSURE: 140 MMHG | HEART RATE: 78 BPM | DIASTOLIC BLOOD PRESSURE: 88 MMHG | HEIGHT: 67 IN | WEIGHT: 249 LBS

## 2022-11-29 DIAGNOSIS — I47.29 OTHER VENTRICULAR TACHYCARDIA: ICD-10-CM

## 2022-11-29 PROCEDURE — 93000 ELECTROCARDIOGRAM COMPLETE: CPT

## 2022-11-29 PROCEDURE — 99214 OFFICE O/P EST MOD 30 MIN: CPT

## 2022-11-29 RX ORDER — METOPROLOL SUCCINATE 50 MG/1
50 TABLET, EXTENDED RELEASE ORAL DAILY
Qty: 90 | Refills: 3 | Status: ACTIVE | COMMUNITY
Start: 2022-08-16 | End: 1900-01-01

## 2022-11-29 NOTE — HISTORY OF PRESENT ILLNESS
[FreeTextEntry1] : 53 yo female with pmhx as below is here for a f/up visit\par initial eval recurrent palpitations, on and off last few months as well as chest tightness\par fong with mod effort\par on and off le edema\par s/p echo/MCOT - NSVT, followed by cath - mod to severe ostial diag ds\par bb was increased to 50\par still having recurrent palpations\par seen by ep, rfa discussed

## 2022-11-29 NOTE — ASSESSMENT
[FreeTextEntry1] : 51 yo female with pmhx and presentation as above\par MCOT NSVT at > 200 bpm\par echo - nl lv fnx\par cath - mod to severe diag ds\par cont all meds\par EP f/up noted\par Stress coping mechanisms discussed\par aggressive risk modif\par act as tolerated\par rtc 4-6 months

## 2022-12-13 NOTE — ED ADULT NURSE NOTE - NS ED NURSE RECORD ANOTHER VITAL SIGN
Detail Level: Zone Render In Strict Bullet Format?: No Plan: Needs more time to see how Sprintec will help acne prior to making any further changes. \\nReviewed high potassium foods to avoid/in moderation. Continue Regimen: Spironolactone 150 mg/d\\nCeraVe AM moisturizer\\nclindamycin 1 % lotion every morning, \\nSulfaCleasn wash face once daily in the shower\\nSprintec (GYN, been on for 1 month) Yes

## 2023-01-25 ENCOUNTER — OUTPATIENT (OUTPATIENT)
Dept: OUTPATIENT SERVICES | Facility: HOSPITAL | Age: 53
LOS: 1 days | Discharge: HOME | End: 2023-01-25

## 2023-01-25 VITALS
RESPIRATION RATE: 16 BRPM | HEIGHT: 70 IN | OXYGEN SATURATION: 98 % | DIASTOLIC BLOOD PRESSURE: 80 MMHG | TEMPERATURE: 97 F | HEART RATE: 76 BPM | WEIGHT: 250 LBS | SYSTOLIC BLOOD PRESSURE: 144 MMHG

## 2023-01-25 DIAGNOSIS — I49.3 VENTRICULAR PREMATURE DEPOLARIZATION: ICD-10-CM

## 2023-01-25 DIAGNOSIS — Z01.818 ENCOUNTER FOR OTHER PREPROCEDURAL EXAMINATION: ICD-10-CM

## 2023-01-25 LAB
ALBUMIN SERPL ELPH-MCNC: 5 G/DL — SIGNIFICANT CHANGE UP (ref 3.5–5.2)
ALP SERPL-CCNC: 59 U/L — SIGNIFICANT CHANGE UP (ref 30–115)
ALT FLD-CCNC: 44 U/L — HIGH (ref 0–41)
ANION GAP SERPL CALC-SCNC: 9 MMOL/L — SIGNIFICANT CHANGE UP (ref 7–14)
APPEARANCE UR: CLEAR — SIGNIFICANT CHANGE UP
APTT BLD: 29.9 SEC — SIGNIFICANT CHANGE UP (ref 27–39.2)
AST SERPL-CCNC: 32 U/L — SIGNIFICANT CHANGE UP (ref 0–41)
BACTERIA # UR AUTO: NEGATIVE — SIGNIFICANT CHANGE UP
BASOPHILS # BLD AUTO: 0.06 K/UL — SIGNIFICANT CHANGE UP (ref 0–0.2)
BASOPHILS NFR BLD AUTO: 0.6 % — SIGNIFICANT CHANGE UP (ref 0–1)
BILIRUB SERPL-MCNC: 0.8 MG/DL — SIGNIFICANT CHANGE UP (ref 0.2–1.2)
BILIRUB UR-MCNC: NEGATIVE — SIGNIFICANT CHANGE UP
BLD GP AB SCN SERPL QL: SIGNIFICANT CHANGE UP
BUN SERPL-MCNC: 25 MG/DL — HIGH (ref 10–20)
CALCIUM SERPL-MCNC: 9.8 MG/DL — SIGNIFICANT CHANGE UP (ref 8.4–10.5)
CHLORIDE SERPL-SCNC: 98 MMOL/L — SIGNIFICANT CHANGE UP (ref 98–110)
CO2 SERPL-SCNC: 30 MMOL/L — SIGNIFICANT CHANGE UP (ref 17–32)
COLOR SPEC: COLORLESS — SIGNIFICANT CHANGE UP
CREAT SERPL-MCNC: 0.8 MG/DL — SIGNIFICANT CHANGE UP (ref 0.7–1.5)
DIFF PNL FLD: NEGATIVE — SIGNIFICANT CHANGE UP
EGFR: 89 ML/MIN/1.73M2 — SIGNIFICANT CHANGE UP
EOSINOPHIL # BLD AUTO: 0.21 K/UL — SIGNIFICANT CHANGE UP (ref 0–0.7)
EOSINOPHIL NFR BLD AUTO: 2.3 % — SIGNIFICANT CHANGE UP (ref 0–8)
EPI CELLS # UR: 0 /HPF — SIGNIFICANT CHANGE UP (ref 0–5)
GLUCOSE SERPL-MCNC: 86 MG/DL — SIGNIFICANT CHANGE UP (ref 70–99)
GLUCOSE UR QL: NEGATIVE — SIGNIFICANT CHANGE UP
HCT VFR BLD CALC: 41.5 % — SIGNIFICANT CHANGE UP (ref 37–47)
HGB BLD-MCNC: 14 G/DL — SIGNIFICANT CHANGE UP (ref 12–16)
HYALINE CASTS # UR AUTO: 0 /LPF — SIGNIFICANT CHANGE UP (ref 0–7)
IMM GRANULOCYTES NFR BLD AUTO: 0.3 % — SIGNIFICANT CHANGE UP (ref 0.1–0.3)
INR BLD: 0.94 RATIO — SIGNIFICANT CHANGE UP (ref 0.65–1.3)
KETONES UR-MCNC: NEGATIVE — SIGNIFICANT CHANGE UP
LEUKOCYTE ESTERASE UR-ACNC: ABNORMAL
LYMPHOCYTES # BLD AUTO: 3.45 K/UL — HIGH (ref 1.2–3.4)
LYMPHOCYTES # BLD AUTO: 37.1 % — SIGNIFICANT CHANGE UP (ref 20.5–51.1)
MCHC RBC-ENTMCNC: 28.3 PG — SIGNIFICANT CHANGE UP (ref 27–31)
MCHC RBC-ENTMCNC: 33.7 G/DL — SIGNIFICANT CHANGE UP (ref 32–37)
MCV RBC AUTO: 83.8 FL — SIGNIFICANT CHANGE UP (ref 81–99)
MONOCYTES # BLD AUTO: 0.57 K/UL — SIGNIFICANT CHANGE UP (ref 0.1–0.6)
MONOCYTES NFR BLD AUTO: 6.1 % — SIGNIFICANT CHANGE UP (ref 1.7–9.3)
NEUTROPHILS # BLD AUTO: 4.97 K/UL — SIGNIFICANT CHANGE UP (ref 1.4–6.5)
NEUTROPHILS NFR BLD AUTO: 53.6 % — SIGNIFICANT CHANGE UP (ref 42.2–75.2)
NITRITE UR-MCNC: NEGATIVE — SIGNIFICANT CHANGE UP
NRBC # BLD: 0 /100 WBCS — SIGNIFICANT CHANGE UP (ref 0–0)
PH UR: 6 — SIGNIFICANT CHANGE UP (ref 5–8)
PLATELET # BLD AUTO: 251 K/UL — SIGNIFICANT CHANGE UP (ref 130–400)
POTASSIUM SERPL-MCNC: 3.8 MMOL/L — SIGNIFICANT CHANGE UP (ref 3.5–5)
POTASSIUM SERPL-SCNC: 3.8 MMOL/L — SIGNIFICANT CHANGE UP (ref 3.5–5)
PROT SERPL-MCNC: 7.7 G/DL — SIGNIFICANT CHANGE UP (ref 6–8)
PROT UR-MCNC: NEGATIVE — SIGNIFICANT CHANGE UP
PROTHROM AB SERPL-ACNC: 10.7 SEC — SIGNIFICANT CHANGE UP (ref 9.95–12.87)
RBC # BLD: 4.95 M/UL — SIGNIFICANT CHANGE UP (ref 4.2–5.4)
RBC # FLD: 12.9 % — SIGNIFICANT CHANGE UP (ref 11.5–14.5)
RBC CASTS # UR COMP ASSIST: 0 /HPF — SIGNIFICANT CHANGE UP (ref 0–4)
SODIUM SERPL-SCNC: 137 MMOL/L — SIGNIFICANT CHANGE UP (ref 135–146)
SP GR SPEC: 1.01 — LOW (ref 1.01–1.03)
UROBILINOGEN FLD QL: SIGNIFICANT CHANGE UP
WBC # BLD: 9.29 K/UL — SIGNIFICANT CHANGE UP (ref 4.8–10.8)
WBC # FLD AUTO: 9.29 K/UL — SIGNIFICANT CHANGE UP (ref 4.8–10.8)
WBC UR QL: 0 /HPF — SIGNIFICANT CHANGE UP (ref 0–5)

## 2023-01-25 NOTE — H&P PST ADULT - TEMPERATURE IN CELSIUS (DEGREES C)
I agree. ASA 81 mg daily would be best. Have him try that first. The enteric coated might be easier to swallow. Then he can stop Brilinta. If aspirin doesn't work, he could try plavix again if he is confident that he did not have an allergic reaction to it. Our last resort would be Effient. EE  
35.9

## 2023-01-25 NOTE — H&P PST ADULT - HISTORY OF PRESENT ILLNESS
53 Y/O FEMALE PT TO PAST WITH C/O IRREG HR, PVC. PT C/O              PT NOW FOR SCHEDULED PROCEDURE ( PVC ABLATION) . PT DENIES ANY CP SOB PALP COUGH DYSURIA FEVER URI. PT ABLE TO AMARILIS 1-2 FOS W/O SOB  pt denies any covid s/s, or tested positive in the past  pt advised self quarantine till day of procedure  Anesthesia Alert  NO--Difficult Airway  NO--History of neck surgery or radiation  NO--Limited ROM of neck  NO--History of Malignant hyperthermia  NO--Personal or family history of Pseudocholinesterase deficiency.  NO--Prior Anesthesia Complication  NO--Latex Allergy  NO--Loose teeth  NO--History of Rheumatoid Arthritis  NO--MADELEINE  NO--Bleeding risk  NO--Other_____     51 Y/O FEMALE PT TO PAST WITH C/O IRREG HR, PVC. PT C/O WORSENING PALP PAST 6 MO  PT NOW FOR SCHEDULED PROCEDURE ( PVC ABLATION) . PT DENIES ANY CP SOB PALP COUGH DYSURIA FEVER URI. PT ABLE TO AMARILIS 1-2 FOS W/O SOB  pt denies any covid s/s, or tested positive in the past  pt advised self quarantine till day of procedure  Anesthesia Alert  NO--Difficult Airway  NO--History of neck surgery or radiation  NO--Limited ROM of neck  NO--History of Malignant hyperthermia  NO--Personal or family history of Pseudocholinesterase deficiency.  NO--Prior Anesthesia Complication  NO--Latex Allergy  NO--Loose teeth  NO--History of Rheumatoid Arthritis  NO--MADELEINE  NO--Bleeding risk  NO--Other_____     53 Y/O FEMALE PT TO PAST WITH C/O IRREG HR, PVC. PT C/O WORSENING PALP PAST 6 MO  PT NOW FOR SCHEDULED PROCEDURE ( PVC ABLATION) . PT DENIES ANY CP SOB PALP COUGH DYSURIA FEVER URI. PT ABLE TO AMARILIS 1-2 FOS W/O SOB  pt denies any covid s/s, or tested positive in the past  pt advised self quarantine till day of procedure  Anesthesia Alert  CLASS III  NO--History of neck surgery or radiation  NO--Limited ROM of neck  NO--History of Malignant hyperthermia  NO--Personal or family history of Pseudocholinesterase deficiency.  NO--Prior Anesthesia Complication  NO--Latex Allergy  NO--Loose teeth  NO--History of Rheumatoid Arthritis  NO--MADELEINE  NO--Bleeding risk  NO--Other_____

## 2023-01-26 ENCOUNTER — OUTPATIENT (OUTPATIENT)
Dept: OUTPATIENT SERVICES | Facility: HOSPITAL | Age: 53
LOS: 1 days | Discharge: HOME | End: 2023-01-26

## 2023-01-26 DIAGNOSIS — Z02.9 ENCOUNTER FOR ADMINISTRATIVE EXAMINATIONS, UNSPECIFIED: ICD-10-CM

## 2023-01-26 LAB — BLD GP AB SCN SERPL QL: SIGNIFICANT CHANGE UP

## 2023-01-27 LAB
CULTURE RESULTS: NO GROWTH — SIGNIFICANT CHANGE UP
SPECIMEN SOURCE: SIGNIFICANT CHANGE UP

## 2023-02-05 ENCOUNTER — LABORATORY RESULT (OUTPATIENT)
Age: 53
End: 2023-02-05

## 2023-02-08 ENCOUNTER — INPATIENT (INPATIENT)
Facility: HOSPITAL | Age: 53
LOS: 0 days | Discharge: HOME | DRG: 274 | End: 2023-02-09
Attending: STUDENT IN AN ORGANIZED HEALTH CARE EDUCATION/TRAINING PROGRAM | Admitting: STUDENT IN AN ORGANIZED HEALTH CARE EDUCATION/TRAINING PROGRAM
Payer: COMMERCIAL

## 2023-02-08 ENCOUNTER — APPOINTMENT (OUTPATIENT)
Dept: ELECTROPHYSIOLOGY | Facility: HOSPITAL | Age: 53
End: 2023-02-08
Payer: COMMERCIAL

## 2023-02-08 ENCOUNTER — TRANSCRIPTION ENCOUNTER (OUTPATIENT)
Age: 53
End: 2023-02-08

## 2023-02-08 VITALS — WEIGHT: 250 LBS | HEIGHT: 70 IN

## 2023-02-08 DIAGNOSIS — I49.3 VENTRICULAR PREMATURE DEPOLARIZATION: ICD-10-CM

## 2023-02-08 PROCEDURE — 36415 COLL VENOUS BLD VENIPUNCTURE: CPT

## 2023-02-08 PROCEDURE — C1893: CPT

## 2023-02-08 PROCEDURE — 93623 PRGRMD STIMJ&PACG IV RX NFS: CPT

## 2023-02-08 PROCEDURE — 93623 PRGRMD STIMJ&PACG IV RX NFS: CPT | Mod: 26

## 2023-02-08 PROCEDURE — 93662 INTRACARDIAC ECG (ICE): CPT

## 2023-02-08 PROCEDURE — C1759: CPT

## 2023-02-08 PROCEDURE — 93654 COMPRE EP EVAL TX VT: CPT

## 2023-02-08 PROCEDURE — C1894: CPT

## 2023-02-08 PROCEDURE — 93462 L HRT CATH TRNSPTL PUNCTURE: CPT

## 2023-02-08 PROCEDURE — C1760: CPT

## 2023-02-08 PROCEDURE — 85027 COMPLETE CBC AUTOMATED: CPT

## 2023-02-08 PROCEDURE — 93926 LOWER EXTREMITY STUDY: CPT | Mod: RT

## 2023-02-08 PROCEDURE — C1730: CPT

## 2023-02-08 PROCEDURE — XXXXX: CPT | Mod: 1L

## 2023-02-08 PROCEDURE — C1732: CPT

## 2023-02-08 PROCEDURE — 93005 ELECTROCARDIOGRAM TRACING: CPT

## 2023-02-08 PROCEDURE — C1766: CPT

## 2023-02-08 PROCEDURE — 93318 ECHO TRANSESOPHAGEAL INTRAOP: CPT

## 2023-02-08 PROCEDURE — C1769: CPT

## 2023-02-08 PROCEDURE — 93662 INTRACARDIAC ECG (ICE): CPT | Mod: 26

## 2023-02-08 RX ORDER — ASPIRIN/CALCIUM CARB/MAGNESIUM 324 MG
81 TABLET ORAL
Qty: 0 | Refills: 0 | DISCHARGE

## 2023-02-08 RX ORDER — ATORVASTATIN CALCIUM 80 MG/1
80 TABLET, FILM COATED ORAL AT BEDTIME
Refills: 0 | Status: DISCONTINUED | OUTPATIENT
Start: 2023-02-08 | End: 2023-02-09

## 2023-02-08 RX ORDER — ASPIRIN/CALCIUM CARB/MAGNESIUM 324 MG
81 TABLET ORAL DAILY
Refills: 0 | Status: DISCONTINUED | OUTPATIENT
Start: 2023-02-08 | End: 2023-02-09

## 2023-02-08 RX ORDER — ACETAMINOPHEN 500 MG
650 TABLET ORAL ONCE
Refills: 0 | Status: COMPLETED | OUTPATIENT
Start: 2023-02-08 | End: 2023-02-08

## 2023-02-08 RX ORDER — METOPROLOL TARTRATE 50 MG
50 TABLET ORAL DAILY
Refills: 0 | Status: DISCONTINUED | OUTPATIENT
Start: 2023-02-08 | End: 2023-02-09

## 2023-02-08 RX ORDER — ASPIRIN/CALCIUM CARB/MAGNESIUM 324 MG
1 TABLET ORAL
Qty: 0 | Refills: 0 | DISCHARGE

## 2023-02-08 RX ORDER — VALSARTAN 80 MG/1
80 TABLET ORAL DAILY
Refills: 0 | Status: DISCONTINUED | OUTPATIENT
Start: 2023-02-08 | End: 2023-02-09

## 2023-02-08 RX ADMIN — ATORVASTATIN CALCIUM 80 MILLIGRAM(S): 80 TABLET, FILM COATED ORAL at 23:01

## 2023-02-08 RX ADMIN — Medication 650 MILLIGRAM(S): at 23:28

## 2023-02-08 RX ADMIN — Medication 650 MILLIGRAM(S): at 23:58

## 2023-02-08 NOTE — PATIENT PROFILE ADULT - FALL HARM RISK - HARM RISK INTERVENTIONS
Assistance with ambulation/Assistance OOB with selected safe patient handling equipment/Communicate Risk of Fall with Harm to all staff/Discuss with provider need for PT consult/Monitor gait and stability/Provide patient with walking aids - walker, cane, crutches/Reinforce activity limits and safety measures with patient and family/Sit up slowly, dangle for a short time, stand at bedside before walking/Tailored Fall Risk Interventions/Use of alarms - bed, chair and/or voice tab/Visual Cue: Yellow wristband and red socks/Bed in lowest position, wheels locked, appropriate side rails in place/Call bell, personal items and telephone in reach/Instruct patient to call for assistance before getting out of bed or chair/Non-slip footwear when patient is out of bed/[place] to call system/Physically safe environment - no spills, clutter or unnecessary equipment/Purposeful Proactive Rounding/Room/bathroom lighting operational, light cord in reach

## 2023-02-08 NOTE — DISCHARGE NOTE PROVIDER - CARE PROVIDER_API CALL
Victor Manuel Lloyd)  Cardiovascular Disease; Internal Medicine; Interventional Cardiology; Nuclear Cardiology  [address] [place] [address], [address]  [place] [address]  Phone: [phone]  Fax: [phone]  Scheduled Appointment: 06/06/2023 01:30 PM    Derrell Nance)  Cardiac Electrophysiology; Cardiovascular Disease; [facility]  [address] [place] [address]  Phone: [phone]  Fax: [phone]  Follow Up Time: 1 month

## 2023-02-08 NOTE — PRE-ANESTHESIA EVALUATION ADULT - WEIGHT IN KG
113.4 Melolabial Interpolation Flap Text: A decision was made to reconstruct the defect utilizing an interpolation axial flap and a staged reconstruction.  A telfa template was made of the defect.  This telfa template was then used to outline the melolabial interpolation flap.  The donor area for the pedicle flap was then injected with anesthesia.  The flap was excised through the skin and subcutaneous tissue down to the layer of the underlying musculature.  The pedicle flap was carefully excised within this deep plane to maintain its blood supply.  The edges of the donor site were undermined.   The donor site was closed in a primary fashion.  The pedicle was then rotated into position and sutured.  Once the tube was sutured into place, adequate blood supply was confirmed with blanching and refill.  The pedicle was then wrapped with xeroform gauze and dressed appropriately with a telfa and gauze bandage to ensure continued blood supply and protect the attached pedicle.

## 2023-02-08 NOTE — DISCHARGE NOTE PROVIDER - ATTENDING DISCHARGE PHYSICAL EXAMINATION:
Bilateral femoral venous access is clean, dry, and intact. Right groin with soft hematoma. Arterial Duplex of the right groin with no evidence of pseudoaneurysm or AV fistula. Hgb stable. Patient is hypertensive during this hospitalization and review of outpatient records shows /88 and 144/80 at the last two visits. Will increase valsartan to 160 mg daily.

## 2023-02-08 NOTE — DISCHARGE NOTE PROVIDER - CARE PROVIDERS DIRECT ADDRESSES
[email or web address]

## 2023-02-08 NOTE — DISCHARGE NOTE PROVIDER - PROVIDER TOKENS
PROVIDER:[TOKEN:[[id number]],SCHEDULEDAPPT:[06/06/2023],SCHEDULEDAPPTTIME:[01:30 PM]],PROVIDER:[TOKEN:[[id number]],FOLLOWUP:[1 month]]

## 2023-02-08 NOTE — DISCHARGE NOTE PROVIDER - NSDCCPCAREPLAN_GEN_ALL_CORE_FT
PRINCIPAL DISCHARGE DIAGNOSIS  Diagnosis: Paroxysmal atrial fibrillation  Assessment and Plan of Treatment:        PRINCIPAL DISCHARGE DIAGNOSIS  Diagnosis: PVC (premature ventricular contraction)  Assessment and Plan of Treatment:

## 2023-02-08 NOTE — DISCHARGE NOTE PROVIDER - NSDCCPTREATMENT_GEN_ALL_CORE_FT
PRINCIPAL PROCEDURE  Procedure: Radiofrequency ablation (RFA) of arrhythmogenic focus for atrial fibrillation  Findings and Treatment:   Discharge Instructions:  - Continue Eliquis / Xarelto / pradaxa / warfarin  - Do NOT stop blood thinners unless discussed with doctor  - Cont Protonix 40 mg daily x 30 days  - No heavy lifting > 10 lbs, squatting, or exertional activities 1-2 weeks  - Can take a shower starting today  - No submerging in water for 1 week  - No driving for 3 days         PRINCIPAL PROCEDURE  Procedure: Radiofrequency ablation (RFA) of arrhythmogenic focus for atrial fibrillation  Findings and Treatment: Discharge Instructions:  - Continue Eliquis for 30 days ( PLS CONFIRM) ..........................................  - Do NOT stop blood thinners unless discussed with doctor  - No heavy lifting > 10 lbs, squatting, or exertional activities 1-2 weeks  - Can take a shower starting today  - No submerging in water for 1 week  - No driving for 3 days         PRINCIPAL PROCEDURE  Procedure: Radiofrequency ablation (RFA) of arrhythmogenic focus for atrial fibrillation  Findings and Treatment: Discharge Instructions:  - Continue Eliquis for 30 days   - Do NOT stop blood thinners unless discussed with doctor  - No heavy lifting > 10 lbs, squatting, or exertional activities 2 weeks  - Can take a shower starting today  - No submerging in water for 1 week  - No driving for 3 days  - FU in office on 3/27 1:30 pm         PRINCIPAL PROCEDURE  Procedure: Radiofrequency ablation (RFA) of arrhythmogenic focus for atrial fibrillation  Findings and Treatment: Discharge Instructions:  - Continue Eliquis for 30 days starting tomorrow morning   - Do NOT stop blood thinners unless discussed with doctor  - No heavy lifting > 10 lbs, squatting, or exertional activities 2 weeks  - Can take a shower starting today  - No submerging in water for 1 week  - No driving for 3 days  - FU in office on 3/27 1:30 pm

## 2023-02-08 NOTE — PRE-ANESTHESIA EVALUATION ADULT - NSANTHOSAYNRD_GEN_A_CORE
No. MADELEINE screening performed.  STOP BANG Legend: 0-2 = LOW Risk; 3-4 = INTERMEDIATE Risk; 5-8 = HIGH Risk

## 2023-02-08 NOTE — DISCHARGE NOTE PROVIDER - NSDCMRMEDTOKEN_GEN_ALL_CORE_FT
aspirin 81 mg oral tablet: 81 milligram(s) orally once a day  atorvastatin 80 mg oral tablet: 1 tab(s) orally once a day (at bedtime) MDD:1  metoprolol succinate 50 mg oral tablet, extended release: 1 tab(s) orally once a day MDD:1  valsartan-hydrochlorothiazide 80 mg-12.5 mg oral tablet: 1 tab(s) orally once a day   aspirin 81 mg oral tablet: 81 milligram(s) orally once a day  atorvastatin 80 mg oral tablet: 1 tab(s) orally once a day (at bedtime) MDD:1  hydroCHLOROthiazide 12.5 mg oral capsule: 1 cap(s) orally once a day  metoprolol succinate 50 mg oral tablet, extended release: 1 tab(s) orally once a day MDD:1  valsartan 160 mg oral tablet: 1 tab(s) orally once a day    aspirin 81 mg oral tablet: 81 milligram(s) orally once a day  atorvastatin 80 mg oral tablet: 1 tab(s) orally once a day (at bedtime) MDD:1  Eliquis 5 mg oral tablet: 1 tab(s) orally 2 times a day   hydroCHLOROthiazide 12.5 mg oral capsule: 1 cap(s) orally once a day  metoprolol succinate 50 mg oral tablet, extended release: 1 tab(s) orally once a day MDD:1  valsartan 160 mg oral tablet: 1 tab(s) orally once a day    aspirin 81 mg oral tablet: 81 milligram(s) orally once a day  atorvastatin 80 mg oral tablet: 1 tab(s) orally once a day (at bedtime) MDD:1  Eliquis 5 mg oral tablet: 1 tab(s) orally 2 times a day   hydroCHLOROthiazide 12.5 mg oral capsule: 1 cap(s) orally once a day  metoprolol succinate 50 mg oral tablet, extended release: 1 tab(s) orally once a day MDD:1  pantoprazole 40 mg oral delayed release tablet: 1 tab(s) orally once a day   valsartan 160 mg oral tablet: 1 tab(s) orally once a day

## 2023-02-08 NOTE — DISCHARGE NOTE PROVIDER - NSDCFUSCHEDAPPT_GEN_ALL_CORE_FT
Martina Rosales  [facility]  ELECTROPH [address] [facility]  Scheduled Appointment: 03/27/2023

## 2023-02-08 NOTE — DISCHARGE NOTE PROVIDER - HOSPITAL COURSE
Ms. Rushing is a 51 y/o female w/ h/o PVC, SVT, DLD and varicose veins who presents for an elective PVC ablation. Ms. Kristan Miller is a 51 y/o female with PMHx of Frequent PVCs, SVT, Intermittent palpitations, Abnormal ECG,  CAD in native artery, Cardiomyopathy, unspecified, Dyslipidemia and varicose veins who presents for an elective PVC ablation. On 02/08/23 Patient underwent successful PVC ablation (inferoseptal mitral annulus, transeptal). POD#0 patient developed small right groin hematoma and been monitored overnight. On POD 1 patient remains HD stable with no complaints. Patient remains in SR with no arrhythmias noted on tele. Examination of B/L common femoral venous access sites reveal a Clear and dry area, no erythema. Right groin hematoma resolved. Patient is being DC home in stable condition and will continue with Eliquis 5 mg BID for 30 days ( PLS CONFIRM)................   Patient will follow with Dr Nance clinic's in 1 month.    Ms. Kristan Miller is a 51 y/o female with PMHx of Frequent PVCs, SVT, Intermittent palpitations, Abnormal ECG,  CAD in native artery, Cardiomyopathy, unspecified, Dyslipidemia and varicose veins who presents for an elective PVC ablation. On 02/08/23 Patient underwent successful PVC ablation (inferoseptal mitral annulus, transeptal). POD#0 patient developed small right groin hematoma and been monitored overnight. On POD 1 patient remains HD stable with no complaints. Patient remains in SR with no arrhythmias noted on tele. Examination of B/L common femoral venous access sites reveal a Clear and dry area, no erythema. Right LE venous duplex showed...............   Right groin hematoma now resolved. Patient is being DC home in stable condition and will continue with Eliquis 5 mg BID for 30 days ( PLS CONFIRM)................    Patient will follow with Dr Nance clinic's in 1 month.    Ms. Kristan Miller is a 51 y/o female with PMHx of Frequent PVCs, SVT, Intermittent palpitations, Abnormal ECG,  CAD in native artery, Cardiomyopathy, unspecified, Dyslipidemia and varicose veins who presents for an elective PVC ablation. On 02/08/23 Patient underwent successful PVC ablation (inferoseptal mitral annulus, transeptal). POD#0 patient developed small right groin hematoma and been monitored overnight. On POD 1 patient remains HD stable with no complaints. Patient remains in SR with no arrhythmias noted on tele. Examination of B/L common femoral venous access sites reveal a Clear and dry area, no erythema.   Right LE venous duplex showed...............   Right groin hematoma now resolved. Patient is being DC home in stable condition and will continue with Eliquis 5 mg BID for 30 days ( PLS CONFIRM)................    Patient will follow with Dr Nance clinic's in 1 month.    Ms. Kristan Miller is a 53 y/o female with PMHx of Frequent PVCs, SVT, Intermittent palpitations, Abnormal ECG,  CAD in native artery, Cardiomyopathy, unspecified, Dyslipidemia and varicose veins who presents for an elective PVC ablation. On 02/08/23 Patient underwent successful PVC ablation (inferoseptal mitral annulus, transeptal). POD#0 patient developed small right groin hematoma and been monitored overnight. On POD 1 patient remains HD stable with no complaints. Patient remains in SR with no arrhythmias noted on tele. Examination of B/L common femoral venous access sites reveal a Clear and dry area, no erythema.   Right LE arterial duplex showed...............   Right groin hematoma now resolved, however ecchymoses secondary to AC is still present. Patient was also found to be hypertensive overnight with a max BP of 160/82 mmHg this AM and Valsartan 80 mg was increased to Valsartan 160 mg QD. Patient is being DC home in stable condition and will continue with Eliquis 5 mg BID for 30 days. Patient will follow up with Dr Nance in 1 month.    Ms. Kristan Miller is a 53 y/o female with PMHx of Frequent PVCs, SVT, Intermittent palpitations, Abnormal ECG,  CAD in native artery, Cardiomyopathy, unspecified, Dyslipidemia and varicose veins who presents for an elective PVC ablation. On 02/08/23 Patient underwent successful PVC ablation (inferoseptal mitral annulus, transeptal). POD#0 patient developed small right groin hematoma and been monitored overnight. On POD 1 patient remains HD stable with no complaints. Patient remains in SR with no arrhythmias noted on tele. Examination of B/L common femoral venous access sites reveal a Clear and dry area, no erythema.   Right LE arterial duplex was negative for pseudoaneurysm or AV fistula. Right groin hematoma now resolved, however ecchymoses secondary to AC is still present. Patient was also found to be hypertensive overnight with a max BP of 160/82 mmHg this AM and Valsartan 80 mg was increased to Valsartan 160 mg QD. Patient is being DC home in stable condition and will continue with Eliquis 5 mg BID for 30 days. Patient will follow up with Dr Nance in 1 month.    Ms. Kristan Miller is a 51 y/o female with PMHx of Frequent PVCs, SVT, Intermittent palpitations, Abnormal ECG,  CAD in native artery, Cardiomyopathy, unspecified, Dyslipidemia and varicose veins who presents for an elective PVC ablation. On 02/08/23 Patient underwent successful PVC ablation (inferoseptal mitral annulus, transeptal). POD#0 patient developed small right groin hematoma and been monitored overnight. On POD 1 patient remains HD stable with no complaints. Patient remains in SR with no arrhythmias noted on tele. Examination of B/L common femoral venous access sites reveal a Clear and dry area, no erythema.     Right LE arterial duplex was negative for pseudoaneurysm or AV fistula. Right groin hematoma now resolved, however ecchymoses secondary to AC is still present. Patient was also found to be hypertensive overnight with a max BP of 160/82 mmHg this AM and Valsartan 80 mg was increased to Valsartan 160 mg QD. Patient is being DC home in stable condition and will continue with Eliquis 5 mg BID for 30 days. Patient will follow up with Dr Nance in 1 month.

## 2023-02-09 ENCOUNTER — TRANSCRIPTION ENCOUNTER (OUTPATIENT)
Age: 53
End: 2023-02-09

## 2023-02-09 VITALS
SYSTOLIC BLOOD PRESSURE: 138 MMHG | TEMPERATURE: 100 F | HEART RATE: 72 BPM | DIASTOLIC BLOOD PRESSURE: 62 MMHG | OXYGEN SATURATION: 97 %

## 2023-02-09 LAB
HCT VFR BLD CALC: 34.9 % — LOW (ref 37–47)
HCT VFR BLD CALC: 39 % — SIGNIFICANT CHANGE UP (ref 37–47)
HGB BLD-MCNC: 11.7 G/DL — LOW (ref 12–16)
HGB BLD-MCNC: 13.1 G/DL — SIGNIFICANT CHANGE UP (ref 12–16)
MCHC RBC-ENTMCNC: 27.8 PG — SIGNIFICANT CHANGE UP (ref 27–31)
MCHC RBC-ENTMCNC: 28.4 PG — SIGNIFICANT CHANGE UP (ref 27–31)
MCHC RBC-ENTMCNC: 33.5 G/DL — SIGNIFICANT CHANGE UP (ref 32–37)
MCHC RBC-ENTMCNC: 33.6 G/DL — SIGNIFICANT CHANGE UP (ref 32–37)
MCV RBC AUTO: 82.9 FL — SIGNIFICANT CHANGE UP (ref 81–99)
MCV RBC AUTO: 84.4 FL — SIGNIFICANT CHANGE UP (ref 81–99)
NRBC # BLD: 0 /100 WBCS — SIGNIFICANT CHANGE UP (ref 0–0)
NRBC # BLD: 0 /100 WBCS — SIGNIFICANT CHANGE UP (ref 0–0)
PLATELET # BLD AUTO: 199 K/UL — SIGNIFICANT CHANGE UP (ref 130–400)
PLATELET # BLD AUTO: 231 K/UL — SIGNIFICANT CHANGE UP (ref 130–400)
RBC # BLD: 4.21 M/UL — SIGNIFICANT CHANGE UP (ref 4.2–5.4)
RBC # BLD: 4.62 M/UL — SIGNIFICANT CHANGE UP (ref 4.2–5.4)
RBC # FLD: 12.8 % — SIGNIFICANT CHANGE UP (ref 11.5–14.5)
RBC # FLD: 12.9 % — SIGNIFICANT CHANGE UP (ref 11.5–14.5)
WBC # BLD: 9.24 K/UL — SIGNIFICANT CHANGE UP (ref 4.8–10.8)
WBC # BLD: 9.89 K/UL — SIGNIFICANT CHANGE UP (ref 4.8–10.8)
WBC # FLD AUTO: 9.24 K/UL — SIGNIFICANT CHANGE UP (ref 4.8–10.8)
WBC # FLD AUTO: 9.89 K/UL — SIGNIFICANT CHANGE UP (ref 4.8–10.8)

## 2023-02-09 PROCEDURE — 99232 SBSQ HOSP IP/OBS MODERATE 35: CPT

## 2023-02-09 PROCEDURE — 99239 HOSP IP/OBS DSCHRG MGMT >30: CPT

## 2023-02-09 PROCEDURE — 93926 LOWER EXTREMITY STUDY: CPT | Mod: 26,RT

## 2023-02-09 PROCEDURE — 93010 ELECTROCARDIOGRAM REPORT: CPT

## 2023-02-09 RX ORDER — ACETAMINOPHEN 500 MG
650 TABLET ORAL DAILY
Refills: 0 | Status: DISCONTINUED | OUTPATIENT
Start: 2023-02-09 | End: 2023-02-09

## 2023-02-09 RX ORDER — HYDROCHLOROTHIAZIDE 25 MG
1 TABLET ORAL
Qty: 30 | Refills: 0
Start: 2023-02-09 | End: 2023-03-10

## 2023-02-09 RX ORDER — PANTOPRAZOLE SODIUM 20 MG/1
1 TABLET, DELAYED RELEASE ORAL
Qty: 30 | Refills: 0
Start: 2023-02-09 | End: 2023-03-10

## 2023-02-09 RX ORDER — VALSARTAN 80 MG/1
1 TABLET ORAL
Qty: 30 | Refills: 0
Start: 2023-02-09 | End: 2023-03-10

## 2023-02-09 RX ORDER — ATORVASTATIN CALCIUM 80 MG/1
1 TABLET, FILM COATED ORAL
Qty: 30 | Refills: 0
Start: 2023-02-09 | End: 2023-03-10

## 2023-02-09 RX ORDER — VALSARTAN 80 MG/1
80 TABLET ORAL ONCE
Refills: 0 | Status: COMPLETED | OUTPATIENT
Start: 2023-02-09 | End: 2023-02-09

## 2023-02-09 RX ORDER — APIXABAN 2.5 MG/1
5 TABLET, FILM COATED ORAL ONCE
Refills: 0 | Status: COMPLETED | OUTPATIENT
Start: 2023-02-09 | End: 2023-02-09

## 2023-02-09 RX ORDER — APIXABAN 2.5 MG/1
1 TABLET, FILM COATED ORAL
Qty: 60 | Refills: 0
Start: 2023-02-09 | End: 2023-03-10

## 2023-02-09 RX ORDER — METOPROLOL TARTRATE 50 MG
1 TABLET ORAL
Qty: 30 | Refills: 0
Start: 2023-02-09 | End: 2023-03-10

## 2023-02-09 RX ADMIN — Medication 50 MILLIGRAM(S): at 05:48

## 2023-02-09 RX ADMIN — VALSARTAN 80 MILLIGRAM(S): 80 TABLET ORAL at 05:49

## 2023-02-09 RX ADMIN — Medication 81 MILLIGRAM(S): at 12:06

## 2023-02-09 RX ADMIN — Medication 650 MILLIGRAM(S): at 16:18

## 2023-02-09 RX ADMIN — APIXABAN 5 MILLIGRAM(S): 2.5 TABLET, FILM COATED ORAL at 16:18

## 2023-02-09 RX ADMIN — VALSARTAN 80 MILLIGRAM(S): 80 TABLET ORAL at 12:07

## 2023-02-09 NOTE — PROGRESS NOTE ADULT - ASSESSMENT
EP: Bobby    Ms. Kristan Miller is a 51 y/o female with PMHx of Frequent PVCs, SVT, Intermittent palpitations, CAD, Cardiomyopathy, unspecified, Dyslipidemia and varicose veins who presents for an elective PVC ablation on 02/08/23. Patient with small right groin hematoma intraop. On POD 1 patient remains HD stable with no complaints. Patient remains in SR with no arrhythmias noted on tele.    Plan:  - EKG noted  - Awaiting Rt groin venous/arterial duplex  - If duplex normal, then start Eliquis 5 mg Po Q 12 for stroke prevention (ablated in LV)  - Ambulate as tolerated and monitor groins  - Cont Toprol XL 50 mg daily    Discharge Instructions:  - Continue Eliquis for 30 days postop  - Do NOT stop blood thinners unless discussed with doctor  - No heavy lifting > 10 lbs, squatting, or exertional activities 2 weeks  - Can take a shower starting today  - No submerging in water for 1 week  - No driving for 3 days  - FU in office on 3/27 1:30 pm

## 2023-02-09 NOTE — DISCHARGE NOTE NURSING/CASE MANAGEMENT/SOCIAL WORK - NSDCPEELIQUISCOMP_GEN_ALL_CORE
Apixaban/Eliquis is used to treat and prevent blood clots. If you are not able to swallow the tablets whole, they may be crushed and mixed in water, apple juice, or applesauce and promptly taken within four hours. Never skip a dose of Apixaban/Eliquis. If you forget to take your Apixaban/Eliquis, take a dose as soon as you remember. If it is almost time for your next Apixaban/Eliquis dose, wait until then and take a regular dose. DO NOT take an extra pill to ‘catch up’.  NEVER TAKE A DOUBLE DOSE. Notify your doctor that you missed a dose. Take Apixaban/Eliquis at the same time each morning and evening. Apixaban/Eliquis may be taken with other medication or food. none

## 2023-02-09 NOTE — DISCHARGE NOTE NURSING/CASE MANAGEMENT/SOCIAL WORK - NSDCPEFALRISK_GEN_ALL_CORE
For information on Fall & Injury Prevention, visit: [email or web address] OR  [email or web address] OR  https://www.cdc.gov/steadi/patient.html

## 2023-02-09 NOTE — DISCHARGE NOTE NURSING/CASE MANAGEMENT/SOCIAL WORK - PATIENT PORTAL LINK FT
You can access the FollowMyHealth Patient Portal offered by [facility] by registering at the following website: [email or web address]. By joining [employer]’s FollowMyHealth portal, you will also be able to view your health information using other applications (apps) compatible with our system.

## 2023-02-10 ENCOUNTER — APPOINTMENT (OUTPATIENT)
Dept: ELECTROPHYSIOLOGY | Facility: CLINIC | Age: 53
End: 2023-02-10
Payer: COMMERCIAL

## 2023-02-10 ENCOUNTER — APPOINTMENT (OUTPATIENT)
Dept: VASCULAR SURGERY | Facility: CLINIC | Age: 53
End: 2023-02-10
Payer: COMMERCIAL

## 2023-02-10 DIAGNOSIS — S30.1XXA CONTUSION OF ABDOMINAL WALL, INITIAL ENCOUNTER: ICD-10-CM

## 2023-02-10 PROCEDURE — 93926 LOWER EXTREMITY STUDY: CPT

## 2023-02-10 PROCEDURE — 99024 POSTOP FOLLOW-UP VISIT: CPT

## 2023-02-10 RX ORDER — ATORVASTATIN CALCIUM 40 MG/1
40 TABLET, FILM COATED ORAL DAILY
Qty: 90 | Refills: 3 | Status: DISCONTINUED | COMMUNITY
Start: 2022-09-30 | End: 2023-02-10

## 2023-02-10 RX ORDER — VALSARTAN AND HYDROCHLOROTHIAZIDE 80; 12.5 MG/1; MG/1
80-12.5 TABLET, FILM COATED ORAL DAILY
Refills: 0 | Status: DISCONTINUED | COMMUNITY
End: 2023-02-10

## 2023-02-10 RX ORDER — PANTOPRAZOLE 20 MG/1
20 TABLET, DELAYED RELEASE ORAL
Refills: 0 | Status: ACTIVE | COMMUNITY
Start: 2023-02-10

## 2023-02-10 NOTE — CARDIOLOGY SUMMARY
[de-identified] : (11/22/2022) ECG sinus rhythm at 80 bpm; frequent PVCs; PVC morphology superior axis, RBBB-like, R<S in V6, narrowish consistent with Left posterior fascicular PVC (differential posteromedial papillary muscle)\par 10/3/2022 NSR (HR 68 bm), No PVCs [de-identified] : (10/3/2022 to 11/1/2022) MCOT 30 days: PVC burden 13% - no VT, no AF, no SVT - Average HR 78 bpm, range  bpm.\par 7/19-7/26/22 5 days analyzed, PVC burden 12.3%. NSVT (longest 8 beats) [de-identified] : 8/16/2022 EF 55-60% Grade 1 DD. Mild LAE. Mild MR. Mild TR.  [de-identified] : (11/17/2022) CMR LVEF 44% [de-identified] : RFA ablation for PVCs on 2/8/2023. [de-identified] : 9/1/2022 2v disease (80% ostial D1). Normal LVEDP. Mild systemic HTN.

## 2023-02-10 NOTE — PHYSICAL EXAM
[Well Developed] : well developed [Well Nourished] : well nourished [No Acute Distress] : no acute distress [Obese] : obese [Normal Conjunctiva] : normal conjunctiva [Normal Venous Pressure] : normal venous pressure [Normal S1, S2] : normal S1, S2 [No Murmur] : no murmur [Clear Lung Fields] : clear lung fields [Good Air Entry] : good air entry [No Respiratory Distress] : no respiratory distress  [Soft] : abdomen soft [Normal Gait] : normal gait [No Edema] : no edema [No Rash] : no rash [Moves all extremities] : moves all extremities [Normal Speech] : normal speech [Alert and Oriented] : alert and oriented [Normal memory] : normal memory [de-identified] : s/p ablation on 2/8/23: RLE groin appears ecchymotic with some swelling. No pain, no drainage.

## 2023-02-10 NOTE — HISTORY OF PRESENT ILLNESS
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 \par \par Frequent PVCs\par Being seen for daily palpitations lasting 2-3 min. First noticed palpitations about 13 years ago but thought they were panic attacks. She discussed it with her PCP. Over the summer, episodes started to get strong and would wake her up at night, thus prompting her to see Dr. Lloyd. Episodes have never lasted more than a few min. \par \par She had COVID in Aug 2020 and had some issues long term with being unable to catch  her breath.  She wore MCOT with Dr. Lloyd for 7 days. PVC burden was 12%. NSVT (longest 8 beats). Had a cath with 1v disease (D1 os - medically managed).\par \par CMR at [facility] showed mildly reduced EF with LVEF 44%, RVEF 57%\par \par repeat MCOT showed PVC burden 13%\par \par S/P Ablation on 2/8/2023.

## 2023-02-10 NOTE — ASSESSMENT
[FreeTextEntry1] : 53 y/o female w/ a PMHx of frequent PVCs, SVT, intermittent palpitations, abnormal ECG, CAD in native artery, cardiomyopathy, DLD, varicose veins presents for urgent f/u s/p RFA ablation for PVCs on 2/8/2023. \par She woke up this morning with increased swelling and bruising of her R-groin. The patient has no pain when walking, no fever and no draining from incision site. \par She has no chest pain, no shortness of breath, no dyspnea on exertion, no orthopnea, no PND. She denies dizziness, lightheadedness and syncope. She has no exertional symptoms.\par \par # Frequent PVCs s/p ablation on 2/8/23\par - R-groin small hematoma secondary to AC\par - RLE US ordered to assess hematoma.\par - Cont Metoprolol succ 50mg QD, Eliquis 5mg BID\par - Pulm referral put in at [facility] to assess for sleep apnea. Pt aware and will schedule appt.\par \par # HTN\par - All medications were reconciled during this visit.\par - Continue valsartan 160mg and HCTZ 12.5mg\par \par \par F/U with Dr. Rosales as scheduled on 3/23/2023\par \par \par I have also advised the patient to go to the nearest emergency room if he experiences any chest pain, dyspnea, syncope, or has any other compelling symptoms.

## 2023-02-19 DIAGNOSIS — I47.1 SUPRAVENTRICULAR TACHYCARDIA: ICD-10-CM

## 2023-02-19 DIAGNOSIS — I10 ESSENTIAL (PRIMARY) HYPERTENSION: ICD-10-CM

## 2023-02-19 DIAGNOSIS — I49.3 VENTRICULAR PREMATURE DEPOLARIZATION: ICD-10-CM
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
## 2023-02-19 DIAGNOSIS — L76.32 POSTPROCEDURAL HEMATOMA OF SKIN AND SUBCUTANEOUS TISSUE FOLLOWING OTHER PROCEDURE: ICD-10-CM

## 2023-02-19 DIAGNOSIS — E78.5 HYPERLIPIDEMIA, UNSPECIFIED: ICD-10-CM

## 2023-02-19 DIAGNOSIS — I42.9 CARDIOMYOPATHY, UNSPECIFIED: ICD-10-CM

## 2023-02-19 DIAGNOSIS — I83.90 ASYMPTOMATIC VARICOSE VEINS OF UNSPECIFIED LOWER EXTREMITY: ICD-10-CM

## 2023-02-19 DIAGNOSIS — I25.10 ATHEROSCLEROTIC HEART DISEASE OF NATIVE CORONARY ARTERY WITHOUT ANGINA PECTORIS: ICD-10-CM

## 2023-02-28 ENCOUNTER — APPOINTMENT (OUTPATIENT)
Dept: CARDIOLOGY | Facility: CLINIC | Age: 53
End: 2023-02-28
Payer: COMMERCIAL

## 2023-02-28 VITALS
BODY MASS INDEX: 39.24 KG/M2 | SYSTOLIC BLOOD PRESSURE: 122 MMHG | HEART RATE: 66 BPM | HEIGHT: 67 IN | DIASTOLIC BLOOD PRESSURE: 70 MMHG | WEIGHT: 250 LBS

## 2023-02-28 PROCEDURE — 99214 OFFICE O/P EST MOD 30 MIN: CPT

## 2023-02-28 PROCEDURE — 93000 ELECTROCARDIOGRAM COMPLETE: CPT

## 2023-02-28 RX ORDER — ATORVASTATIN CALCIUM 40 MG/1
40 TABLET, FILM COATED ORAL DAILY
Qty: 90 | Refills: 3 | Status: ACTIVE | COMMUNITY
Start: 2023-02-10 | End: 1900-01-01

## 2023-02-28 NOTE — PHYSICAL EXAM
[No Acute Distress] : no acute distress [Normal Venous Pressure] : normal venous pressure [No Carotid Bruit] : no carotid bruit [Normal S1, S2] : normal S1, S2 [No Murmur] : no murmur [No Rub] : no rub [No Gallop] : no gallop [Clear Lung Fields] : clear lung fields [Good Air Entry] : good air entry [No Respiratory Distress] : no respiratory distress  [Soft] : abdomen soft [Non Tender] : non-tender [No Masses/organomegaly] : no masses/organomegaly [Normal Bowel Sounds] : normal bowel sounds [Normal Gait] : normal gait [No Edema] : no edema [No Cyanosis] : no cyanosis [No Clubbing] : no clubbing [No Varicosities] : no varicosities [No Rash] : no rash [No Skin Lesions] : no skin lesions [Moves all extremities] : moves all extremities [No Focal Deficits] : no focal deficits [Normal Speech] : normal speech [Alert and Oriented] : alert and oriented [Normal memory] : normal memory Statement Selected

## 2023-02-28 NOTE — HISTORY OF PRESENT ILLNESS
[FreeTextEntry1] : 52 yo female with pmhx as below is here for a f/up visit\par initial eval recurrent palpitations, on and off last few months as well as chest tightness\par fong with mod effort\par on and off le edema\par s/p echo/MCOT - NSVT, followed by cath - mod to severe ostial diag ds\par s/p rfa\par palpations have resolved since the ablation\par seen by ep

## 2023-02-28 NOTE — ASSESSMENT
[FreeTextEntry1] : 54 yo female with pmhx and presentation as above\par s/p rfa for PVCs and NSVT\par echo - nl lv fnx\par cath - mod to severe diag ds\par cont all meds\par EP f/up noted\par Decrease statin to 40\par d/w EP re: elquis \par off ppi\par repeat labs \par aggressive risk modif\par act as tolerated\par rtc 4-6 months

## 2023-03-09 RX ORDER — HYDROCHLOROTHIAZIDE 12.5 MG/1
12.5 CAPSULE ORAL
Qty: 90 | Refills: 3 | Status: ACTIVE | COMMUNITY
Start: 2023-02-10 | End: 1900-01-01

## 2023-03-13 ENCOUNTER — APPOINTMENT (OUTPATIENT)
Dept: ELECTROPHYSIOLOGY | Facility: CLINIC | Age: 53
End: 2023-03-13
Payer: COMMERCIAL

## 2023-03-13 VITALS
OXYGEN SATURATION: 97 % | HEIGHT: 67 IN | HEART RATE: 69 BPM | WEIGHT: 250 LBS | DIASTOLIC BLOOD PRESSURE: 84 MMHG | BODY MASS INDEX: 39.24 KG/M2 | SYSTOLIC BLOOD PRESSURE: 134 MMHG

## 2023-03-13 DIAGNOSIS — I49.3 VENTRICULAR PREMATURE DEPOLARIZATION: ICD-10-CM

## 2023-03-13 DIAGNOSIS — R00.2 PALPITATIONS: ICD-10-CM

## 2023-03-13 PROCEDURE — 99214 OFFICE O/P EST MOD 30 MIN: CPT

## 2023-03-13 PROCEDURE — 93000 ELECTROCARDIOGRAM COMPLETE: CPT

## 2023-03-13 RX ORDER — APIXABAN 5 MG/1
5 TABLET, FILM COATED ORAL
Qty: 90 | Refills: 3 | Status: DISCONTINUED | COMMUNITY
Start: 2023-02-10 | End: 2023-03-13

## 2023-03-13 RX ORDER — COLD-HOT PACK
125 MCG EACH MISCELLANEOUS
Qty: 30 | Refills: 0 | Status: DISCONTINUED | COMMUNITY
Start: 2022-07-15 | End: 2023-03-13

## 2023-03-13 NOTE — ASSESSMENT
[FreeTextEntry1] : # HTN\par - BP well controlled\par - Cont Metoprolol and Valsartan, and HCTZ\par - 2g Na diet enforced\par \par # Frequent PVCs s/p ablation 2/8/23 (inferoseptal mitral annulus, transseptal)\par - PVC burden on BB prior to ablation ~ 13%. \par - Cont Metoprolol\par - Cardiac MRI showed no LGE, but LVEF 44%\par - Cath with D1 ostial disease. \par - D/c Eliquis. \par \par I have also advised the patient to go to the nearest emergency room if she experiences any chest pain, dyspnea, syncope, or has any other compelling symptoms.\par \par Follow up in 6 mo

## 2023-03-13 NOTE — CARDIOLOGY SUMMARY
[de-identified] : 3/13/2023 NSR (HR 69 bpm), no PVCs\par 10/3/2022 NSR (HR 68 bm), No PVCs [de-identified] : 7/19-7/26/22 5 days analyzed, PVC burden 12.3%. NSVT (longest 8 beats).  [de-identified] : 8/16/2022 EF 55-60% Grade 1 DD. Mild LAE. Mild MR. Mild TR.  [de-identified] : 9/1/2022 2v disease (80% ostial D1). Normal LVEDP. Mild systemic HTN.

## 2023-03-13 NOTE — PHYSICAL EXAM
[Well Developed] : well developed [Well Nourished] : well nourished [No Acute Distress] : no acute distress [Obese] : obese [Normal Conjunctiva] : normal conjunctiva [Normal Venous Pressure] : normal venous pressure [Normal S1, S2] : normal S1, S2 [No Murmur] : no murmur [Clear Lung Fields] : clear lung fields [Good Air Entry] : good air entry [No Respiratory Distress] : no respiratory distress  [Soft] : abdomen soft [Normal Gait] : normal gait [No Edema] : no edema [No Rash] : no rash [Moves all extremities] : moves all extremities [Normal Speech] : normal speech [Alert and Oriented] : alert and oriented [Normal memory] : normal memory [de-identified] : bilateral groins evaluated. Mild ecchymosis along medial aspect of right thigh.

## 2023-03-13 NOTE — HISTORY OF PRESENT ILLNESS
[FreeTextEntry1] : \par Cardiologist: Dr. Lloyd\par PCP: Dr. 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 First noticed palpitations about 13 years ago but thought they were panic attacks. Over the summer, episodes started to get strong and would wake her up at night, thus prompting her to see Dr. Lloyd. Episodes have never lasted more than a few min. \par \par Of note, she also had dizziness, lightheadedness before seeing Dr. Lloyd but her BP was very high. Since having her BP better controlled, dizziness and lightheadedness improved. She had COVID in Aug 2020 and had some issues long term with being unable to catch  her breath.  She wore MCOT with Dr. Lloyd for 7 days. PVC burden was 12%. NSVT (longest 8 beats). Had a cath with 1v disease (D1 os - medically managed). Denies syncope. \par \par She underwent EPS and ablation 2/8/23 with Dr. Nance with post op small hematoma. She is here for routine follow up visit. Feels well. The patient denies any cardiovascular complaints including chest pain, dyspnea, palpitations, dizziness, lightheadedness, presyncope or syncope.

## 2023-03-27 ENCOUNTER — APPOINTMENT (OUTPATIENT)
Dept: ELECTROPHYSIOLOGY | Facility: CLINIC | Age: 53
End: 2023-03-27

## 2023-04-03 ENCOUNTER — APPOINTMENT (OUTPATIENT)
Dept: ELECTROPHYSIOLOGY | Facility: CLINIC | Age: 53
End: 2023-04-03

## 2023-04-03 ENCOUNTER — APPOINTMENT (OUTPATIENT)
Dept: CARDIOLOGY | Facility: CLINIC | Age: 53
End: 2023-04-03

## 2023-04-11 RX ORDER — VALSARTAN 160 MG/1
160 TABLET, COATED ORAL DAILY
Qty: 90 | Refills: 2 | Status: ACTIVE | COMMUNITY
Start: 2023-02-10 | End: 1900-01-01

## 2023-08-29 ENCOUNTER — RESULT CHARGE (OUTPATIENT)
Age: 53
End: 2023-08-29

## 2023-08-29 ENCOUNTER — APPOINTMENT (OUTPATIENT)
Dept: CARDIOLOGY | Facility: CLINIC | Age: 53
End: 2023-08-29
Payer: COMMERCIAL

## 2023-08-29 VITALS
BODY MASS INDEX: 39.24 KG/M2 | WEIGHT: 250 LBS | TEMPERATURE: 97.1 F | HEART RATE: 72 BPM | RESPIRATION RATE: 15 BRPM | OXYGEN SATURATION: 98 % | HEIGHT: 67 IN

## 2023-08-29 VITALS — SYSTOLIC BLOOD PRESSURE: 158 MMHG | DIASTOLIC BLOOD PRESSURE: 103 MMHG

## 2023-08-29 VITALS — SYSTOLIC BLOOD PRESSURE: 120 MMHG | DIASTOLIC BLOOD PRESSURE: 82 MMHG

## 2023-08-29 DIAGNOSIS — I10 ESSENTIAL (PRIMARY) HYPERTENSION: ICD-10-CM

## 2023-08-29 DIAGNOSIS — R94.31 ABNORMAL ELECTROCARDIOGRAM [ECG] [EKG]: ICD-10-CM

## 2023-08-29 DIAGNOSIS — Z98.890 OTHER SPECIFIED POSTPROCEDURAL STATES: ICD-10-CM

## 2023-08-29 DIAGNOSIS — Z86.79 OTHER SPECIFIED POSTPROCEDURAL STATES: ICD-10-CM

## 2023-08-29 DIAGNOSIS — I42.9 CARDIOMYOPATHY, UNSPECIFIED: ICD-10-CM

## 2023-08-29 DIAGNOSIS — E78.5 HYPERLIPIDEMIA, UNSPECIFIED: ICD-10-CM

## 2023-08-29 DIAGNOSIS — I25.10 ATHEROSCLEROTIC HEART DISEASE OF NATIVE CORONARY ARTERY W/OUT ANGINA PECTORIS: ICD-10-CM

## 2023-08-29 PROCEDURE — 99214 OFFICE O/P EST MOD 30 MIN: CPT

## 2023-08-29 PROCEDURE — 93000 ELECTROCARDIOGRAM COMPLETE: CPT

## 2023-08-29 NOTE — ASSESSMENT
[FreeTextEntry1] : 54 yo female with pmhx and presentation as above s/p rfa for PVCs and NSVT echo - nl lv fnx cath - mod to severe diag ds - med rx cont all meds EP f/up noted resume statin repeat labs reviewed, lipids discussed weight reduction  aggressive risk modif act as tolerated rtc prn

## 2023-08-29 NOTE — HISTORY OF PRESENT ILLNESS
[FreeTextEntry1] : 54 yo female with pmhx as below is here for a f/up visit initial eval recurrent palpitations, on and off last few months as well as chest tightness fong with mod effort on and off le edema s/p echo/MCOT - NSVT, followed by cath - mod to severe ostial diag ds s/p PVCs rfa palpations have resolved since the ablation recent covid stopped statin for a while

## 2023-09-14 NOTE — ED ADULT NURSE NOTE - PRO INTERPRETER NEED 2
English Soolantra Counseling: I discussed with the patients the risks of topial Soolantra. This is a medicine which decreases the number of mites and inflammation in the skin. You experience burning, stinging, eye irritation or allergic reactions.  Please call our office if you develop any problems from using this medication.

## 2023-10-19 ENCOUNTER — TRANSCRIPTION ENCOUNTER (OUTPATIENT)
Age: 53
End: 2023-10-19

## 2023-11-20 ENCOUNTER — APPOINTMENT (OUTPATIENT)
Dept: ELECTROPHYSIOLOGY | Facility: CLINIC | Age: 53
End: 2023-11-20

## 2024-06-09 ENCOUNTER — TRANSCRIPTION ENCOUNTER (OUTPATIENT)
Age: 54
End: 2024-06-09
